# Patient Record
Sex: FEMALE | Race: WHITE | NOT HISPANIC OR LATINO | URBAN - METROPOLITAN AREA
[De-identification: names, ages, dates, MRNs, and addresses within clinical notes are randomized per-mention and may not be internally consistent; named-entity substitution may affect disease eponyms.]

---

## 2017-01-23 PROBLEM — Z85.820 PERSONAL HISTORY OF MALIGNANT MELANOMA OF SKIN: Status: ACTIVE | Noted: 2017-01-23

## 2017-10-30 ENCOUNTER — APPOINTMENT (RX ONLY)
Dept: URBAN - METROPOLITAN AREA CLINIC 20 | Facility: CLINIC | Age: 67
Setting detail: DERMATOLOGY
End: 2017-10-30

## 2017-10-30 DIAGNOSIS — Z85.820 PERSONAL HISTORY OF MALIGNANT MELANOMA OF SKIN: ICD-10-CM

## 2017-10-30 DIAGNOSIS — D22 MELANOCYTIC NEVI: ICD-10-CM

## 2017-10-30 DIAGNOSIS — L81.4 OTHER MELANIN HYPERPIGMENTATION: ICD-10-CM

## 2017-10-30 DIAGNOSIS — L82.1 OTHER SEBORRHEIC KERATOSIS: ICD-10-CM

## 2017-10-30 DIAGNOSIS — D18.0 HEMANGIOMA: ICD-10-CM

## 2017-10-30 DIAGNOSIS — L57.8 OTHER SKIN CHANGES DUE TO CHRONIC EXPOSURE TO NONIONIZING RADIATION: ICD-10-CM

## 2017-10-30 PROBLEM — D18.01 HEMANGIOMA OF SKIN AND SUBCUTANEOUS TISSUE: Status: ACTIVE | Noted: 2017-10-30

## 2017-10-30 PROBLEM — D22.5 MELANOCYTIC NEVI OF TRUNK: Status: ACTIVE | Noted: 2017-10-30

## 2017-10-30 PROCEDURE — 99214 OFFICE O/P EST MOD 30 MIN: CPT

## 2017-10-30 PROCEDURE — ? COUNSELING

## 2017-10-30 ASSESSMENT — LOCATION DETAILED DESCRIPTION DERM
LOCATION DETAILED: LEFT DORSAL FOREARM
LOCATION DETAILED: RIGHT ANTERIOR THIGH
LOCATION DETAILED: RIGHT PROXIMAL DORSAL FOREARM
LOCATION DETAILED: LEFT ANTERIOR THIGH
LOCATION DETAILED: LEFT MID BACK
LOCATION DETAILED: LEFT UPPER BACK
LOCATION DETAILED: LEFT DORSAL HAND
LOCATION DETAILED: LEFT CHEEK
LOCATION DETAILED: RIGHT UPPER BACK
LOCATION DETAILED: RIGHT DORSAL FOREARM
LOCATION DETAILED: RIGHT CHEEK
LOCATION DETAILED: RIGHT DORSAL HAND

## 2017-10-30 ASSESSMENT — LOCATION SIMPLE DESCRIPTION DERM
LOCATION SIMPLE: LEFT UPPER EXTREMITY
LOCATION SIMPLE: LEFT CHEEK
LOCATION SIMPLE: BACK
LOCATION SIMPLE: RIGHT UPPER EXTREMITY
LOCATION SIMPLE: LEFT LOWER EXTREMITY
LOCATION SIMPLE: LEFT HAND
LOCATION SIMPLE: RIGHT FOREARM
LOCATION SIMPLE: RIGHT LOWER EXTREMITY
LOCATION SIMPLE: RIGHT CHEEK
LOCATION SIMPLE: RIGHT HAND

## 2017-10-30 ASSESSMENT — LOCATION ZONE DERM
LOCATION ZONE: ARM
LOCATION ZONE: FACE
LOCATION ZONE: TRUNK
LOCATION ZONE: LEG
LOCATION ZONE: HAND

## 2018-03-23 ENCOUNTER — HOSPITAL ENCOUNTER (OUTPATIENT)
Dept: HOSPITAL 8 - CFH | Age: 68
Discharge: HOME | End: 2018-03-23
Attending: NURSE PRACTITIONER
Payer: MEDICARE

## 2018-03-23 DIAGNOSIS — M85.88: ICD-10-CM

## 2018-03-23 DIAGNOSIS — N95.8: ICD-10-CM

## 2018-03-23 DIAGNOSIS — Z13.820: ICD-10-CM

## 2018-03-23 DIAGNOSIS — Z12.31: Primary | ICD-10-CM

## 2018-03-23 PROCEDURE — 77080 DXA BONE DENSITY AXIAL: CPT

## 2018-03-23 PROCEDURE — 77063 BREAST TOMOSYNTHESIS BI: CPT

## 2018-05-04 ENCOUNTER — APPOINTMENT (RX ONLY)
Dept: URBAN - METROPOLITAN AREA CLINIC 20 | Facility: CLINIC | Age: 68
Setting detail: DERMATOLOGY
End: 2018-05-04

## 2018-05-04 DIAGNOSIS — L81.4 OTHER MELANIN HYPERPIGMENTATION: ICD-10-CM

## 2018-05-04 DIAGNOSIS — L57.8 OTHER SKIN CHANGES DUE TO CHRONIC EXPOSURE TO NONIONIZING RADIATION: ICD-10-CM

## 2018-05-04 DIAGNOSIS — D22 MELANOCYTIC NEVI: ICD-10-CM

## 2018-05-04 DIAGNOSIS — L82.1 OTHER SEBORRHEIC KERATOSIS: ICD-10-CM

## 2018-05-04 DIAGNOSIS — D18.0 HEMANGIOMA: ICD-10-CM

## 2018-05-04 DIAGNOSIS — Z85.820 PERSONAL HISTORY OF MALIGNANT MELANOMA OF SKIN: ICD-10-CM

## 2018-05-04 PROBLEM — D22.5 MELANOCYTIC NEVI OF TRUNK: Status: ACTIVE | Noted: 2018-05-04

## 2018-05-04 PROBLEM — D18.01 HEMANGIOMA OF SKIN AND SUBCUTANEOUS TISSUE: Status: ACTIVE | Noted: 2018-05-04

## 2018-05-04 PROCEDURE — 99214 OFFICE O/P EST MOD 30 MIN: CPT

## 2018-05-04 PROCEDURE — ? COUNSELING

## 2018-05-04 ASSESSMENT — LOCATION SIMPLE DESCRIPTION DERM
LOCATION SIMPLE: LEFT LOWER EXTREMITY
LOCATION SIMPLE: RIGHT UPPER EXTREMITY
LOCATION SIMPLE: RIGHT FOREARM
LOCATION SIMPLE: LEFT CHEEK
LOCATION SIMPLE: RIGHT CHEEK
LOCATION SIMPLE: BACK
LOCATION SIMPLE: RIGHT LOWER EXTREMITY
LOCATION SIMPLE: RIGHT HAND
LOCATION SIMPLE: LEFT UPPER EXTREMITY
LOCATION SIMPLE: LEFT HAND

## 2018-05-04 ASSESSMENT — LOCATION DETAILED DESCRIPTION DERM
LOCATION DETAILED: LEFT UPPER BACK
LOCATION DETAILED: LEFT ANTERIOR THIGH
LOCATION DETAILED: RIGHT UPPER BACK
LOCATION DETAILED: RIGHT DORSAL FOREARM
LOCATION DETAILED: LEFT CHEEK
LOCATION DETAILED: RIGHT DORSAL HAND
LOCATION DETAILED: LEFT DORSAL HAND
LOCATION DETAILED: LEFT MID BACK
LOCATION DETAILED: LEFT DORSAL FOREARM
LOCATION DETAILED: RIGHT ANTERIOR THIGH
LOCATION DETAILED: RIGHT PROXIMAL DORSAL FOREARM
LOCATION DETAILED: RIGHT CHEEK

## 2018-05-04 ASSESSMENT — LOCATION ZONE DERM
LOCATION ZONE: HAND
LOCATION ZONE: LEG
LOCATION ZONE: ARM
LOCATION ZONE: TRUNK
LOCATION ZONE: FACE

## 2018-11-02 ENCOUNTER — APPOINTMENT (RX ONLY)
Dept: URBAN - METROPOLITAN AREA CLINIC 20 | Facility: CLINIC | Age: 68
Setting detail: DERMATOLOGY
End: 2018-11-02

## 2018-11-02 DIAGNOSIS — D18.0 HEMANGIOMA: ICD-10-CM

## 2018-11-02 DIAGNOSIS — L82.1 OTHER SEBORRHEIC KERATOSIS: ICD-10-CM

## 2018-11-02 DIAGNOSIS — L57.8 OTHER SKIN CHANGES DUE TO CHRONIC EXPOSURE TO NONIONIZING RADIATION: ICD-10-CM

## 2018-11-02 DIAGNOSIS — Z85.820 PERSONAL HISTORY OF MALIGNANT MELANOMA OF SKIN: ICD-10-CM

## 2018-11-02 DIAGNOSIS — L81.4 OTHER MELANIN HYPERPIGMENTATION: ICD-10-CM

## 2018-11-02 DIAGNOSIS — L57.0 ACTINIC KERATOSIS: ICD-10-CM

## 2018-11-02 DIAGNOSIS — D22 MELANOCYTIC NEVI: ICD-10-CM

## 2018-11-02 PROBLEM — D18.01 HEMANGIOMA OF SKIN AND SUBCUTANEOUS TISSUE: Status: ACTIVE | Noted: 2018-11-02

## 2018-11-02 PROBLEM — D48.5 NEOPLASM OF UNCERTAIN BEHAVIOR OF SKIN: Status: ACTIVE | Noted: 2018-11-02

## 2018-11-02 PROBLEM — D22.5 MELANOCYTIC NEVI OF TRUNK: Status: ACTIVE | Noted: 2018-11-02

## 2018-11-02 PROCEDURE — 17000 DESTRUCT PREMALG LESION: CPT

## 2018-11-02 PROCEDURE — 11101: CPT

## 2018-11-02 PROCEDURE — 17003 DESTRUCT PREMALG LES 2-14: CPT

## 2018-11-02 PROCEDURE — ? BIOPSY BY SHAVE METHOD

## 2018-11-02 PROCEDURE — ? LIQUID NITROGEN

## 2018-11-02 PROCEDURE — 99214 OFFICE O/P EST MOD 30 MIN: CPT | Mod: 25

## 2018-11-02 PROCEDURE — ? COUNSELING

## 2018-11-02 PROCEDURE — 11100: CPT | Mod: 59

## 2018-11-02 ASSESSMENT — LOCATION SIMPLE DESCRIPTION DERM
LOCATION SIMPLE: RIGHT LOWER EXTREMITY
LOCATION SIMPLE: RIGHT UPPER EXTREMITY
LOCATION SIMPLE: LEFT HAND
LOCATION SIMPLE: RIGHT CHEEK
LOCATION SIMPLE: LEFT CHEEK
LOCATION SIMPLE: RIGHT HAND
LOCATION SIMPLE: LEFT LOWER EXTREMITY
LOCATION SIMPLE: BACK
LOCATION SIMPLE: LEFT UPPER BACK
LOCATION SIMPLE: RIGHT FOREARM
LOCATION SIMPLE: CHEST
LOCATION SIMPLE: LEFT UPPER EXTREMITY

## 2018-11-02 ASSESSMENT — LOCATION DETAILED DESCRIPTION DERM
LOCATION DETAILED: LEFT UPPER BACK
LOCATION DETAILED: RIGHT VENTRAL DISTAL FOREARM
LOCATION DETAILED: RIGHT DORSAL HAND
LOCATION DETAILED: UPPER STERNUM
LOCATION DETAILED: LEFT CHEEK
LOCATION DETAILED: LEFT MID BACK
LOCATION DETAILED: MIDDLE STERNUM
LOCATION DETAILED: RIGHT UPPER BACK
LOCATION DETAILED: LEFT ANTERIOR THIGH
LOCATION DETAILED: RIGHT ANTERIOR THIGH
LOCATION DETAILED: RIGHT DORSAL FOREARM
LOCATION DETAILED: LEFT DORSAL FOREARM
LOCATION DETAILED: RIGHT CHEEK
LOCATION DETAILED: RIGHT PROXIMAL DORSAL FOREARM
LOCATION DETAILED: LEFT MEDIAL UPPER BACK
LOCATION DETAILED: LEFT DORSAL HAND

## 2018-11-02 ASSESSMENT — LOCATION ZONE DERM
LOCATION ZONE: HAND
LOCATION ZONE: TRUNK
LOCATION ZONE: ARM
LOCATION ZONE: LEG
LOCATION ZONE: FACE

## 2018-11-02 NOTE — PROCEDURE: BIOPSY BY SHAVE METHOD
Billing Type: Third-Party Bill
Was A Bandage Applied: Yes
Biopsy Method: Personna blade
Electrodesiccation Text: The wound bed was treated with electrodesiccation after the biopsy was performed.
Cryotherapy Text: The wound bed was treated with cryotherapy after the biopsy was performed.
Hemostasis: Drysol and Electrocautery
Size Of Lesion In Cm: 0.7
Biopsy Type: H and E
Anesthesia Type: 1% lidocaine with 1:100,000 epinephrine and a 1:6 solution of 8.4% sodium bicarbonate
Silver Nitrate Text: The wound bed was treated with silver nitrate after the biopsy was performed.
Post-Care Instructions: I reviewed with the patient in detail post-care instructions. Patient is to keep the biopsy site dry overnight, and then apply bacitracin twice daily until healed. Patient may apply hydrogen peroxide soaks to remove any crusting.
Additional Anesthesia Volume In Cc (Will Not Render If 0): 0
Curettage Text: The wound bed was treated with curettage after the biopsy was performed.
Type Of Destruction Used: Curettage
Anesthesia Volume In Cc: 0.2
Lab Facility: 
Notification Instructions: Patient will be notified of biopsy results. However, patient instructed to call the office if not contacted within 2 weeks.
Consent: Written consent was obtained and risks were reviewed including but not limited to scarring, infection, bleeding, scabbing, incomplete removal, nerve damage and allergy to anesthesia.
X Size Of Lesion In Cm: 0.9
Bill [id number] For Specimen Handling/Conveyance To Laboratory?: no
Electrodesiccation And Curettage Text: The wound bed was treated with electrodesiccation and curettage after the biopsy was performed.
Wound Care: Aquaphor
Detail Level: Detailed
Depth Of Biopsy: dermis
Dressing: Band-Aid
Lab: [id number]
X Size Of Lesion In Cm: 0.5

## 2018-11-02 NOTE — HPI: MELANOMA F/U (HISTORY OF MALIGNANT MELANOMA)
What Is The Reason For Today's Visit?: History of Melanoma
Breslow Depth?: 0.2mm
Year Excised?: 11/2016

## 2019-01-21 ENCOUNTER — APPOINTMENT (RX ONLY)
Dept: URBAN - METROPOLITAN AREA CLINIC 20 | Facility: CLINIC | Age: 69
Setting detail: DERMATOLOGY
End: 2019-01-21

## 2019-01-21 DIAGNOSIS — L57.8 OTHER SKIN CHANGES DUE TO CHRONIC EXPOSURE TO NONIONIZING RADIATION: ICD-10-CM

## 2019-01-21 DIAGNOSIS — L57.0 ACTINIC KERATOSIS: ICD-10-CM | Status: INADEQUATELY CONTROLLED

## 2019-01-21 PROCEDURE — ? PRESCRIPTION

## 2019-01-21 PROCEDURE — ? DIAGNOSIS COMMENT

## 2019-01-21 PROCEDURE — 99214 OFFICE O/P EST MOD 30 MIN: CPT | Mod: 25

## 2019-01-21 PROCEDURE — ? COUNSELING

## 2019-01-21 PROCEDURE — 17000 DESTRUCT PREMALG LESION: CPT

## 2019-01-21 PROCEDURE — ? LIQUID NITROGEN

## 2019-01-21 PROCEDURE — ? ADDITIONAL NOTES

## 2019-01-21 RX ORDER — IMIQUIMOD 50 MG/G
CREAM TOPICAL
Qty: 1 | Refills: 1 | Status: ERX | COMMUNITY
Start: 2019-01-21

## 2019-01-21 RX ADMIN — IMIQUIMOD: 50 CREAM TOPICAL at 00:00

## 2019-01-21 ASSESSMENT — LOCATION DETAILED DESCRIPTION DERM
LOCATION DETAILED: RIGHT VENTRAL DISTAL FOREARM
LOCATION DETAILED: LEFT MEDIAL SUPERIOR CHEST
LOCATION DETAILED: UPPER STERNUM

## 2019-01-21 ASSESSMENT — LOCATION ZONE DERM
LOCATION ZONE: TRUNK
LOCATION ZONE: ARM

## 2019-01-21 ASSESSMENT — LOCATION SIMPLE DESCRIPTION DERM
LOCATION SIMPLE: RIGHT FOREARM
LOCATION SIMPLE: CHEST

## 2019-01-21 NOTE — PROCEDURE: ADDITIONAL NOTES
Additional Notes: Treat chest with imiquimod \\n1/21/19 Start\\nApply twice a day for 2 weeks\\n2/4/19 Stop\\nPatient to RTC in 1 months following treatment.
Detail Level: Simple

## 2019-03-05 ENCOUNTER — APPOINTMENT (RX ONLY)
Dept: URBAN - METROPOLITAN AREA CLINIC 20 | Facility: CLINIC | Age: 69
Setting detail: DERMATOLOGY
End: 2019-03-05

## 2019-03-05 DIAGNOSIS — D22 MELANOCYTIC NEVI: ICD-10-CM

## 2019-03-05 DIAGNOSIS — D18.0 HEMANGIOMA: ICD-10-CM

## 2019-03-05 DIAGNOSIS — L82.1 OTHER SEBORRHEIC KERATOSIS: ICD-10-CM

## 2019-03-05 DIAGNOSIS — Z85.820 PERSONAL HISTORY OF MALIGNANT MELANOMA OF SKIN: ICD-10-CM

## 2019-03-05 DIAGNOSIS — L57.8 OTHER SKIN CHANGES DUE TO CHRONIC EXPOSURE TO NONIONIZING RADIATION: ICD-10-CM

## 2019-03-05 DIAGNOSIS — L81.4 OTHER MELANIN HYPERPIGMENTATION: ICD-10-CM

## 2019-03-05 DIAGNOSIS — Z87.2 PERSONAL HISTORY OF DISEASES OF THE SKIN AND SUBCUTANEOUS TISSUE: ICD-10-CM

## 2019-03-05 PROBLEM — D22.5 MELANOCYTIC NEVI OF TRUNK: Status: ACTIVE | Noted: 2019-03-05

## 2019-03-05 PROBLEM — D18.01 HEMANGIOMA OF SKIN AND SUBCUTANEOUS TISSUE: Status: ACTIVE | Noted: 2019-03-05

## 2019-03-05 PROCEDURE — 99214 OFFICE O/P EST MOD 30 MIN: CPT

## 2019-03-05 PROCEDURE — ? COUNSELING

## 2019-03-05 PROCEDURE — ? ADDITIONAL NOTES

## 2019-03-05 PROCEDURE — ? DIAGNOSIS COMMENT

## 2019-03-05 ASSESSMENT — LOCATION DETAILED DESCRIPTION DERM
LOCATION DETAILED: LEFT DORSAL FOREARM
LOCATION DETAILED: RIGHT CHEEK
LOCATION DETAILED: LEFT MID BACK
LOCATION DETAILED: RIGHT PROXIMAL DORSAL FOREARM
LOCATION DETAILED: LEFT UPPER BACK
LOCATION DETAILED: LEFT MEDIAL SUPERIOR CHEST
LOCATION DETAILED: RIGHT DORSAL HAND
LOCATION DETAILED: LEFT DORSAL HAND
LOCATION DETAILED: RIGHT ANTERIOR THIGH
LOCATION DETAILED: LEFT ANTERIOR THIGH
LOCATION DETAILED: RIGHT DORSAL FOREARM
LOCATION DETAILED: RIGHT UPPER BACK
LOCATION DETAILED: LEFT CHEEK

## 2019-03-05 ASSESSMENT — LOCATION SIMPLE DESCRIPTION DERM
LOCATION SIMPLE: LEFT LOWER EXTREMITY
LOCATION SIMPLE: RIGHT CHEEK
LOCATION SIMPLE: BACK
LOCATION SIMPLE: LEFT UPPER EXTREMITY
LOCATION SIMPLE: LEFT HAND
LOCATION SIMPLE: LEFT CHEEK
LOCATION SIMPLE: RIGHT UPPER EXTREMITY
LOCATION SIMPLE: RIGHT LOWER EXTREMITY
LOCATION SIMPLE: RIGHT HAND
LOCATION SIMPLE: CHEST
LOCATION SIMPLE: RIGHT FOREARM

## 2019-03-05 ASSESSMENT — LOCATION ZONE DERM
LOCATION ZONE: FACE
LOCATION ZONE: LEG
LOCATION ZONE: HAND
LOCATION ZONE: TRUNK
LOCATION ZONE: ARM

## 2019-03-05 NOTE — HPI: MELANOMA F/U (HISTORY OF MALIGNANT MELANOMA)
What Is The Reason For Today's Visit?: Follow Up Melanoma
Breslow Depth?: 0.2
Year Excised?: 11/2016

## 2019-05-11 ENCOUNTER — OFFICE VISIT (OUTPATIENT)
Dept: URGENT CARE | Facility: CLINIC | Age: 69
End: 2019-05-11
Payer: MEDICARE

## 2019-05-11 VITALS
TEMPERATURE: 97.6 F | RESPIRATION RATE: 16 BRPM | OXYGEN SATURATION: 98 % | HEART RATE: 78 BPM | SYSTOLIC BLOOD PRESSURE: 102 MMHG | WEIGHT: 111 LBS | DIASTOLIC BLOOD PRESSURE: 78 MMHG

## 2019-05-11 DIAGNOSIS — R21 RASH AND NONSPECIFIC SKIN ERUPTION: ICD-10-CM

## 2019-05-11 DIAGNOSIS — L30.9 ACUTE DERMATITIS: Primary | ICD-10-CM

## 2019-05-11 PROCEDURE — 99203 OFFICE O/P NEW LOW 30 MIN: CPT | Performed by: PHYSICIAN ASSISTANT

## 2019-05-11 RX ORDER — PREDNISONE 20 MG/1
20 TABLET ORAL DAILY
Qty: 5 TAB | Refills: 0 | Status: SHIPPED | OUTPATIENT
Start: 2019-05-11 | End: 2019-05-16

## 2019-05-11 ASSESSMENT — ENCOUNTER SYMPTOMS: FEVER: 0

## 2019-05-11 NOTE — PROGRESS NOTES
Subjective:      Adeline Patel is a 69 y.o. female who presents with Lip Swelling (lip swelling , dryness, clear discharge on and off since october )    PMH:  has no past medical history on file.  MEDS:   Current Outpatient Prescriptions:   •  mupirocin (BACTROBAN) 2 % Ointment, Apply to rash twice daily., Disp: 1 Tube, Rfl: 0  •  predniSONE (DELTASONE) 20 MG Tab, Take 1 Tab by mouth every day for 5 days., Disp: 5 Tab, Rfl: 0  ALLERGIES: No Known Allergies  SURGHX: History reviewed. No pertinent surgical history.  SOCHX:  reports that she has never smoked. She has never used smokeless tobacco.  FH: Reviewed with patient, not pertinent to this visit.           Patient presents with:  Lip Swelling: lip swelling , dryness, clear discharge on and off since october (7 months). Pt has been treated for HSV1, with little relief.  Pt denies change in make up, soaps, medications.  Pt has been keeping a food diary as her PCP thinks it may be an allergy.  Pt has not noticed a pattern. Pt has appointment with dermatologist in 5 days but states she is very frustrated and would like a sooner evaluation.           Rash   This is a new problem. The current episode started more than 1 month ago. The problem has been waxing and waning since onset. The affected locations include the lips. The rash is characterized by burning, draining, pain, redness, swelling and peeling. It is unknown if there was an exposure to a precipitant. Pertinent negatives include no fever. Past treatments include moisturizer and cold compress (singulair, zyrtec). The treatment provided no relief.       Review of Systems   Constitutional: Negative for fever.   Skin: Positive for rash.   All other systems reviewed and are negative.         Objective:     /78 (BP Location: Left arm, Patient Position: Sitting, BP Cuff Size: Adult)   Pulse 78   Temp 36.4 °C (97.6 °F) (Temporal)   Resp 16   Wt 50.3 kg (111 lb)   SpO2 98%      Physical Exam      Constitutional: She is oriented to person, place, and time. She appears well-developed and well-nourished.   HENT:   Head: Normocephalic and atraumatic.   Right Ear: Tympanic membrane normal.   Left Ear: Tympanic membrane normal.   Nose: Nose normal.   Mouth/Throat: Uvula is midline and oropharynx is clear and moist.       Eyes: Pupils are equal, round, and reactive to light. Conjunctivae and EOM are normal.   Neck: Normal range of motion. Neck supple.   Cardiovascular: Normal rate, regular rhythm and normal heart sounds.    Pulmonary/Chest: Effort normal and breath sounds normal.   Abdominal: Soft.   Musculoskeletal: Normal range of motion.   Neurological: She is alert and oriented to person, place, and time. Gait normal.   Skin: Skin is warm and dry. Capillary refill takes less than 2 seconds.   Psychiatric: She has a normal mood and affect.   Nursing note and vitals reviewed.         Assessment/Plan:     1. Acute dermatitis  mupirocin (BACTROBAN) 2 % Ointment    predniSONE (DELTASONE) 20 MG Tab   2. Rash and nonspecific skin eruption       Apply ointment to rash twice daily as instructed.     Continue other medications as prescribed by PCP.     Keep dermatology appointment in 5 days.     PT should follow up with PCP in 1-2 days for re-evaluation if symptoms have not improved.  Discussed red flags and reasons to return to UC or ED.  Pt and/or family verbalized understanding of diagnosis and follow up instructions and was offered informational handout on diagnosis.  PT discharged.

## 2019-05-15 ENCOUNTER — APPOINTMENT (RX ONLY)
Dept: URBAN - METROPOLITAN AREA CLINIC 20 | Facility: CLINIC | Age: 69
Setting detail: DERMATOLOGY
End: 2019-05-15

## 2019-05-15 DIAGNOSIS — R21 RASH AND OTHER NONSPECIFIC SKIN ERUPTION: ICD-10-CM

## 2019-05-15 PROCEDURE — 99213 OFFICE O/P EST LOW 20 MIN: CPT

## 2019-05-15 PROCEDURE — ? PRESCRIPTION

## 2019-05-15 PROCEDURE — ? COUNSELING

## 2019-05-15 PROCEDURE — ? ADDITIONAL NOTES

## 2019-05-15 RX ORDER — EMOLLIENT COMBINATION NO. 25
CREAM (GRAM) TOPICAL
Qty: 1 | Refills: 3 | Status: ERX | COMMUNITY
Start: 2019-05-15

## 2019-05-15 RX ADMIN — Medication: at 00:00

## 2019-05-15 ASSESSMENT — LOCATION DETAILED DESCRIPTION DERM
LOCATION DETAILED: RIGHT UPPER CUTANEOUS LIP
LOCATION DETAILED: LEFT UPPER CUTANEOUS LIP
LOCATION DETAILED: RIGHT LOWER CUTANEOUS LIP
LOCATION DETAILED: LEFT LOWER CUTANEOUS LIP

## 2019-05-15 ASSESSMENT — LOCATION SIMPLE DESCRIPTION DERM
LOCATION SIMPLE: LEFT LIP
LOCATION SIMPLE: RIGHT LIP

## 2019-05-15 ASSESSMENT — LOCATION ZONE DERM: LOCATION ZONE: LIP

## 2019-05-15 NOTE — PROCEDURE: ADDITIONAL NOTES
Additional Notes: Plan;\\n-stop spicy or salty foods \\n-no lipsticks\\n-stop tacrolemus\\n-start elidel\\n-patch testing with Dr. Loco\\n-Vaseline to lips throughout the day.\\n-stop aquafor due to lanolin \\n-complete prednisone taper.\\n\\nRTC in 6 weeks s/p patch testing with our clinic.\\n\\nRecords requested from Dr. Vazquez patch test.
Detail Level: Simple

## 2019-06-26 ENCOUNTER — APPOINTMENT (RX ONLY)
Dept: URBAN - METROPOLITAN AREA CLINIC 20 | Facility: CLINIC | Age: 69
Setting detail: DERMATOLOGY
End: 2019-06-26

## 2019-06-26 DIAGNOSIS — R21 RASH AND OTHER NONSPECIFIC SKIN ERUPTION: ICD-10-CM | Status: STABLE

## 2019-06-26 PROCEDURE — ? COUNSELING

## 2019-06-26 PROCEDURE — 99213 OFFICE O/P EST LOW 20 MIN: CPT

## 2019-06-26 PROCEDURE — ? PRESCRIPTION

## 2019-06-26 PROCEDURE — ? ADDITIONAL NOTES

## 2019-06-26 RX ORDER — EMOLLIENT COMBINATION NO.104
CREAM (GRAM) TOPICAL
Qty: 1 | Refills: 0 | Status: ERX | COMMUNITY
Start: 2019-06-26

## 2019-06-26 RX ADMIN — Medication: at 00:00

## 2019-06-26 ASSESSMENT — LOCATION ZONE DERM: LOCATION ZONE: LIP

## 2019-06-26 ASSESSMENT — LOCATION DETAILED DESCRIPTION DERM
LOCATION DETAILED: LEFT LOWER CUTANEOUS LIP
LOCATION DETAILED: RIGHT UPPER CUTANEOUS LIP
LOCATION DETAILED: LEFT UPPER CUTANEOUS LIP
LOCATION DETAILED: RIGHT LOWER CUTANEOUS LIP

## 2019-06-26 ASSESSMENT — LOCATION SIMPLE DESCRIPTION DERM
LOCATION SIMPLE: RIGHT LIP
LOCATION SIMPLE: LEFT LIP

## 2019-06-26 NOTE — PROCEDURE: ADDITIONAL NOTES
Detail Level: Simple
Additional Notes: Noticed pt licking her lips throughout the visit instructed her to stop.\\n\\nPatient to try Dr. Imani Ballesteros lip balm, \\n\\nPrescribed Dexeryl for daily maintenance.\\n\\nReviewed notes from Dr. Vazquez shows + for clobetasol. Recommended avoiding use of TAC, see if any correlation w/ skin sloughing off in oral mucosa. \\n\\nPt has patch test in Oct.

## 2019-07-01 RX ORDER — EMOLLIENT COMBINATION NO.104
CREAM (GRAM) TOPICAL
Qty: 1 | Refills: 0 | Status: ERX

## 2019-10-15 ENCOUNTER — APPOINTMENT (RX ONLY)
Dept: URBAN - METROPOLITAN AREA CLINIC 4 | Facility: CLINIC | Age: 69
Setting detail: DERMATOLOGY
End: 2019-10-15

## 2019-10-15 DIAGNOSIS — L259 CONTACT DERMATITIS AND OTHER ECZEMA, UNSPECIFIED CAUSE: ICD-10-CM

## 2019-10-15 DIAGNOSIS — L30.9 DERMATITIS, UNSPECIFIED: ICD-10-CM

## 2019-10-15 PROCEDURE — 99213 OFFICE O/P EST LOW 20 MIN: CPT | Mod: 25

## 2019-10-15 PROCEDURE — ? COUNSELING

## 2019-10-15 PROCEDURE — 95044 PATCH/APPLICATION TESTS: CPT

## 2019-10-15 PROCEDURE — ? PATCH TESTING

## 2019-10-15 ASSESSMENT — LOCATION SIMPLE DESCRIPTION DERM: LOCATION SIMPLE: UPPER BACK

## 2019-10-15 ASSESSMENT — LOCATION DETAILED DESCRIPTION DERM: LOCATION DETAILED: INFERIOR THORACIC SPINE

## 2019-10-15 ASSESSMENT — LOCATION ZONE DERM: LOCATION ZONE: TRUNK

## 2019-10-15 NOTE — HPI: TESTING (PATCH TESTING)
Has Your Rash Been Biopsied Before?: has not been biopsied previously
Have You Had Previous Patch Testing In The Past?: none
How Severe Is Your Rash At Its Worst?: mild
Additional History: Patient had patch testing done with dr valerio she had prick testing, where she showed a positive allergy to cortisone. She was given tacrolimus ointment 0.03%. She last broke out last year in June and broke out once monthly. She has discontinued lipstick since June and has not had a severe flare.
Who Is Your Referring Doctor?: Marilee Antunez

## 2019-10-15 NOTE — PROCEDURE: PATCH TESTING
Number Of Patches (Maximum Allowable Per Dos By Cms Is 90): 80
Post-Care Instructions: I reviewed with the patient in detail post-care instructions. Patient should not sweat, pick at, or get the patches wet for 48 hours.
Consent: Verbal consent obtained, risks reviewed including but not limited to rash, itching, allergic reaction, systemic rash, remote possibility of anaphylaxis to allergen.
Detail Level: Zone

## 2019-10-17 ENCOUNTER — APPOINTMENT (RX ONLY)
Dept: URBAN - METROPOLITAN AREA CLINIC 4 | Facility: CLINIC | Age: 69
Setting detail: DERMATOLOGY
End: 2019-10-17

## 2019-10-17 DIAGNOSIS — L259 CONTACT DERMATITIS AND OTHER ECZEMA, UNSPECIFIED CAUSE: ICD-10-CM

## 2019-10-17 PROBLEM — L30.9 DERMATITIS, UNSPECIFIED: Status: ACTIVE | Noted: 2019-10-17

## 2019-10-17 PROCEDURE — 99213 OFFICE O/P EST LOW 20 MIN: CPT

## 2019-10-17 PROCEDURE — ? CORE ACDS PATCH TEST READING

## 2019-10-17 NOTE — PROCEDURE: CORE ACDS PATCH TEST READING
Allergen 50 Reaction: no reaction
Name Of Allergen 63: sorbic acid
Name Of Allergen 33: bacitracin
Name Of Allergen 70: ethyl hexyl glycerol
Name Of Allergen 34: fragrance mix 2
Name Of Allergen 26: benzocaine
Name Of Allergen 2: amerchol L101
Name Of Allergen 15: carba mix
Name Of Allergen 71: triamcinalone acetonide
Name Of Allergen 64: cananga odorata
Name Of Allergen 44: oleamidopropyl dimethylamine
Detail Level: Zone
Name Of Allergen 18: quaternium 15
Number Of Patches Read: 80
Name Of Allergen 79: 2-ethylhexyl-4-methoxycinnamate
Name Of Allergen 24: thiuram mix A
Name Of Allergen 32: 2-mercaptobenzothiazole
Name Of Allergen 25: diazolidinyl urea
Name Of Allergen 59: 4-chloro-3-cresol
Name Of Allergen 80: benzyl alcohol
Name Of Allergen 28: gold sodium thiosulfate
Name Of Allergen 10: balsam of peru
Name Of Allergen 12: cobalt 2 chloride hexahydrate
Name Of Allergen 6: fragrance mix
Name Of Allergen 58: cocunut diethanolamide
Name Of Allergen 29: imidazolidinyl urea
Name Of Allergen 39: polymyxin B sulfate
Name Of Allergen 61: benzophenone chloride
Name Of Allergen 62: sodium benzoate
Show Negative Results In The Note?: Yes
Name Of Allergen 37: propylene glycol
Name Of Allergen 54: 4-chloro-3,5-xylenol
Name Of Allergen 9: methylisothiazolinone
Name Of Allergen 66: dimethylol dihydroxyethyleneurea
Name Of Allergen 19: methyldibromo glutaronitrile
Name Of Allergen 11: ethylenediamine dihydrochloride
Name Of Allergen 47: lavandula angustifolia oil
Name Of Allergen 36: lidocaine-hci
Name Of Allergen 76: disperse orange-3
Name Of Allergen 21: formaldehyde
Name Of Allergen 52: chlorhexidine diclugonate
Name Of Allergen 74: ethyl cyanoacrylate
Name Of Allergen 75: phenoxyethanol
Name Of Allergen 48: cinnamic aldehyde
Name Of Allergen 78: butylhydroxytoluene
Name Of Allergen 14: bisphenol A epoxy resin
Name Of Allergen 4: potassium dichromate
Name Of Allergen 17: methylchloroisothiazolinone/methylisothiazolinone
Name Of Allergen 73: amidoamine
Name Of Allergen 49: dl alpha tocopherol
What Reading Time Point?: 48 hour
Name Of Allergen 3: neomycin sulfate
Name Of Allergen 35: disperse blue mix 124/106
Name Of Allergen 38: iodopropyynyl betaine
Name Of Allergen 30: budesonide
Name Of Allergen 43: 2-hydroxyethyl-methacrylate
Name Of Allergen 40: cocamidopropyl betaine
Name Of Allergen 45: decyl glucoside
Name Of Allergen 67: sorbitan sesquioleate
Name Of Allergen 68: 1,3-diphenylguanidine
Name Of Allergen 22: mercapto mix A
Name Of Allergen 27: tixocortol-21-pivalate
Name Of Allergen 46: methyl methacrylate
Name Of Allergen 77: benzoic acid
Name Of Allergen 23: 2-bromo-2-nitropropane-1,3-diol
Name Of Allergen 55: 2-hydroxy-4-methoxybenzophenone
Name Of Allergen 1: nickel sulfate hexahydrate
Name Of Allergen 72: clobetasol-17-propionate
Name Of Allergen 20: petrolatum-phenylenediamine
Name Of Allergen 13: petrolatum-tert- butylphenol formaldehyde resin
Name Of Allergen 53: propolis
Name Of Allergen 60: benzalkonium chloride
Name Of Allergen 8: paraben mix B
Name Of Allergen 41: mixed dialkyl thioureas
Name Of Allergen 50: ethyl acrylate
Name Of Allergen 16: black rubber mix
Name Of Allergen 69: cetylstearyalcohol
Name Of Allergen 56: tosylamide formaldehyde resin
Name Of Allergen 51: tea tree oil, oxidized
Name Of Allergen 7: colophony
Name Of Allergen 31: hydrocortisone-17-butyrate
Name Of Allergen 5: DMDM hydantoin
Name Of Allergen 57: sesquiterpenelactone mix
Name Of Allergen 42: dimethylaminopropylamine
Name Of Allergen 65: compositae mix

## 2019-10-17 NOTE — HPI: TESTING (PATCH TESTING)
Have You Had Previous Patch Testing In The Past?: [place] [facility]
How Severe Is Your Rash At Its Worst?: mild
Additional History: Patient is here for her 48 initial read
Who Is Your Referring Doctor?: Marilee Antunez

## 2019-10-22 ENCOUNTER — APPOINTMENT (RX ONLY)
Dept: URBAN - METROPOLITAN AREA CLINIC 4 | Facility: CLINIC | Age: 69
Setting detail: DERMATOLOGY
End: 2019-10-22

## 2019-10-22 DIAGNOSIS — L259 CONTACT DERMATITIS AND OTHER ECZEMA, UNSPECIFIED CAUSE: ICD-10-CM

## 2019-10-22 PROBLEM — L30.9 DERMATITIS, UNSPECIFIED: Status: ACTIVE | Noted: 2019-10-22

## 2019-10-22 PROCEDURE — ? ADDITIONAL NOTES

## 2019-10-22 PROCEDURE — ? CORE ACDS PATCH TEST READING

## 2019-10-22 PROCEDURE — 99214 OFFICE O/P EST MOD 30 MIN: CPT

## 2019-10-22 NOTE — PROCEDURE: CORE ACDS PATCH TEST READING
Allergen 85 Reaction: no reaction
Name Of Allergen 43: 2-hydroxyethyl-methacrylate
Name Of Allergen 54: 4-chloro-3,5-xylenol
Name Of Allergen 65: compositae mix
Name Of Allergen 15: carba mix
Name Of Allergen 47: lavandula angustifolia oil
Name Of Allergen 64: cananga odorata
Name Of Allergen 2: amerchol L101
Name Of Allergen 11: ethylenediamine dihydrochloride
Name Of Allergen 33: bacitracin
Name Of Allergen 38: iodopropyynyl betaine
Name Of Allergen 76: disperse orange-3
Name Of Allergen 49: dl alpha tocopherol
Show Allergen Counseling In The Note?: Yes
Name Of Allergen 60: benzalkonium chloride
Name Of Allergen 20: petrolatum-phenylenediamine
Name Of Allergen 35: disperse blue mix 124/106
Name Of Allergen 6: fragrance mix
Name Of Allergen 72: clobetasol-17-propionate
Name Of Allergen 56: tosylamide formaldehyde resin
Name Of Allergen 36: lidocaine-hci
Name Of Allergen 67: sorbitan sesquioleate
Name Of Allergen 80: benzyl alcohol
Name Of Allergen 42: dimethylaminopropylamine
Name Of Allergen 73: amidoamine
Name Of Allergen 59: 4-chloro-3-cresol
Name Of Allergen 66: dimethylol dihydroxyethyleneurea
Name Of Allergen 52: chlorhexidine diclugonate
Name Of Allergen 70: ethyl hexyl glycerol
Name Of Allergen 40: cocamidopropyl betaine
Name Of Allergen 25: diazolidinyl urea
Number Of Patches Read: 80
Name Of Allergen 79: 2-ethylhexyl-4-methoxycinnamate
Name Of Allergen 68: 1,3-diphenylguanidine
Name Of Allergen 50: ethyl acrylate
Name Of Allergen 51: tea tree oil, oxidized
Name Of Allergen 34: fragrance mix 2
Name Of Allergen 57: sesquiterpenelactone mix
Name Of Allergen 14: bisphenol A epoxy resin
Detail Level: Zone
Name Of Allergen 17: methylchloroisothiazolinone/methylisothiazolinone
Name Of Allergen 27: tixocortol-21-pivalate
Name Of Allergen 69: cetylstearyalcohol
Name Of Allergen 8: paraben mix B
Name Of Allergen 4: potassium dichromate
Name Of Allergen 77: benzoic acid
Name Of Allergen 63: sorbic acid
Name Of Allergen 75: phenoxyethanol
Name Of Allergen 22: mercapto mix A
Name Of Allergen 78: butylhydroxytoluene
Name Of Allergen 44: oleamidopropyl dimethylamine
Name Of Allergen 26: benzocaine
Name Of Allergen 19: methyldibromo glutaronitrile
Name Of Allergen 46: methyl methacrylate
Name Of Allergen 39: polymyxin B sulfate
Name Of Allergen 55: 2-hydroxy-4-methoxybenzophenone
Name Of Allergen 62: sodium benzoate
Name Of Allergen 18: quaternium 15
Name Of Allergen 16: black rubber mix
Name Of Allergen 12: cobalt 2 chloride hexahydrate
Name Of Allergen 3: neomycin sulfate
Name Of Allergen 58: cocunut diethanolamide
Name Of Allergen 29: imidazolidinyl urea
Name Of Allergen 37: propylene glycol
Name Of Allergen 45: decyl glucoside
Name Of Allergen 61: benzophenone chloride
Name Of Allergen 30: budesonide
Name Of Allergen 1: nickel sulfate hexahydrate
Name Of Allergen 9: methylisothiazolinone
Name Of Allergen 7: colophony
Name Of Allergen 32: 2-mercaptobenzothiazole
Name Of Allergen 28: gold sodium thiosulfate
Name Of Allergen 10: balsam of peru
Name Of Allergen 71: triamcinalone acetonide
Name Of Allergen 13: petrolatum-tert- butylphenol formaldehyde resin
Name Of Allergen 53: propolis
What Reading Time Point?: 168 hour
Name Of Allergen 23: 2-bromo-2-nitropropane-1,3-diol
Name Of Allergen 41: mixed dialkyl thioureas
Name Of Allergen 24: thiuram mix A
Name Of Allergen 48: cinnamic aldehyde
Name Of Allergen 21: formaldehyde
Name Of Allergen 5: DMDM hydantoin
Name Of Allergen 74: ethyl cyanoacrylate
Name Of Allergen 31: hydrocortisone-17-butyrate

## 2019-10-22 NOTE — HPI: TESTING (PATCH TESTING READING, DISCUSSION OF RESULTS)
How Severe Is Your Rash?: mild
What Patch Testing Have You Undergone?: Core ACDS Recommended Series
What Reading Is This?: 168 hour patch test reading
Additional History: Patient was referred to us by Marilee Antunez

## 2019-10-22 NOTE — PROCEDURE: ADDITIONAL NOTES
Additional Notes: Discussed with patient regarding the relevance of the very mild positive to gold thiosulfate. Follow up with Marilee Antunez.
Detail Level: Simple

## 2019-10-28 ENCOUNTER — APPOINTMENT (RX ONLY)
Dept: URBAN - METROPOLITAN AREA CLINIC 20 | Facility: CLINIC | Age: 69
Setting detail: DERMATOLOGY
End: 2019-10-28

## 2019-10-28 DIAGNOSIS — D22 MELANOCYTIC NEVI: ICD-10-CM

## 2019-10-28 DIAGNOSIS — L82.1 OTHER SEBORRHEIC KERATOSIS: ICD-10-CM

## 2019-10-28 DIAGNOSIS — R21 RASH AND OTHER NONSPECIFIC SKIN ERUPTION: ICD-10-CM | Status: STABLE

## 2019-10-28 DIAGNOSIS — D18.0 HEMANGIOMA: ICD-10-CM

## 2019-10-28 DIAGNOSIS — L57.0 ACTINIC KERATOSIS: ICD-10-CM | Status: INADEQUATELY CONTROLLED

## 2019-10-28 DIAGNOSIS — L57.8 OTHER SKIN CHANGES DUE TO CHRONIC EXPOSURE TO NONIONIZING RADIATION: ICD-10-CM

## 2019-10-28 DIAGNOSIS — L81.4 OTHER MELANIN HYPERPIGMENTATION: ICD-10-CM

## 2019-10-28 DIAGNOSIS — Z85.820 PERSONAL HISTORY OF MALIGNANT MELANOMA OF SKIN: ICD-10-CM

## 2019-10-28 PROBLEM — D22.5 MELANOCYTIC NEVI OF TRUNK: Status: ACTIVE | Noted: 2019-10-28

## 2019-10-28 PROBLEM — D18.01 HEMANGIOMA OF SKIN AND SUBCUTANEOUS TISSUE: Status: ACTIVE | Noted: 2019-10-28

## 2019-10-28 PROCEDURE — 99214 OFFICE O/P EST MOD 30 MIN: CPT

## 2019-10-28 PROCEDURE — ? DIAGNOSIS COMMENT

## 2019-10-28 PROCEDURE — ? COUNSELING

## 2019-10-28 PROCEDURE — ? ADDITIONAL NOTES

## 2019-10-28 ASSESSMENT — LOCATION SIMPLE DESCRIPTION DERM
LOCATION SIMPLE: LEFT UPPER EXTREMITY
LOCATION SIMPLE: LEFT LIP
LOCATION SIMPLE: RIGHT HAND
LOCATION SIMPLE: RIGHT LIP
LOCATION SIMPLE: BACK
LOCATION SIMPLE: RIGHT FOREARM
LOCATION SIMPLE: RIGHT LOWER EXTREMITY
LOCATION SIMPLE: LEFT HAND
LOCATION SIMPLE: LEFT LOWER EXTREMITY
LOCATION SIMPLE: RIGHT UPPER EXTREMITY
LOCATION SIMPLE: RIGHT CHEEK
LOCATION SIMPLE: LEFT CHEEK

## 2019-10-28 ASSESSMENT — LOCATION DETAILED DESCRIPTION DERM
LOCATION DETAILED: LEFT UPPER BACK
LOCATION DETAILED: LEFT DORSAL HAND
LOCATION DETAILED: RIGHT CHEEK
LOCATION DETAILED: LEFT CHEEK
LOCATION DETAILED: LEFT DORSAL FOREARM
LOCATION DETAILED: LEFT LOWER CUTANEOUS LIP
LOCATION DETAILED: RIGHT PROXIMAL DORSAL FOREARM
LOCATION DETAILED: LEFT UPPER CUTANEOUS LIP
LOCATION DETAILED: LEFT MID BACK
LOCATION DETAILED: LEFT ANTERIOR THIGH
LOCATION DETAILED: RIGHT UPPER CUTANEOUS LIP
LOCATION DETAILED: RIGHT DORSAL FOREARM
LOCATION DETAILED: LEFT INFERIOR MEDIAL MALAR CHEEK
LOCATION DETAILED: RIGHT DORSAL HAND
LOCATION DETAILED: RIGHT ANTERIOR THIGH
LOCATION DETAILED: RIGHT UPPER BACK
LOCATION DETAILED: RIGHT LOWER CUTANEOUS LIP

## 2019-10-28 ASSESSMENT — LOCATION ZONE DERM
LOCATION ZONE: FACE
LOCATION ZONE: HAND
LOCATION ZONE: LIP
LOCATION ZONE: LEG
LOCATION ZONE: ARM
LOCATION ZONE: TRUNK

## 2019-10-28 NOTE — PROCEDURE: DIAGNOSIS COMMENT
Comment: 10/22/19; 80 extended patch test completed; mild positive to gold thiosulfate
Detail Level: Simple

## 2019-11-19 ENCOUNTER — APPOINTMENT (RX ONLY)
Dept: URBAN - METROPOLITAN AREA CLINIC 22 | Facility: CLINIC | Age: 69
Setting detail: DERMATOLOGY
End: 2019-11-19

## 2019-11-19 DIAGNOSIS — L57.8 OTHER SKIN CHANGES DUE TO CHRONIC EXPOSURE TO NONIONIZING RADIATION: ICD-10-CM

## 2019-11-19 DIAGNOSIS — L57.0 ACTINIC KERATOSIS: ICD-10-CM

## 2019-11-19 PROCEDURE — 17000 DESTRUCT PREMALG LESION: CPT

## 2019-11-19 PROCEDURE — ? COUNSELING

## 2019-11-19 PROCEDURE — ? LIQUID NITROGEN

## 2019-11-19 PROCEDURE — ? ADDITIONAL NOTES

## 2019-11-19 PROCEDURE — 17003 DESTRUCT PREMALG LES 2-14: CPT

## 2019-11-19 PROCEDURE — 99213 OFFICE O/P EST LOW 20 MIN: CPT | Mod: 25

## 2019-11-19 ASSESSMENT — LOCATION SIMPLE DESCRIPTION DERM
LOCATION SIMPLE: LEFT FOREHEAD
LOCATION SIMPLE: LEFT CHEEK
LOCATION SIMPLE: RIGHT FOREHEAD

## 2019-11-19 ASSESSMENT — LOCATION DETAILED DESCRIPTION DERM
LOCATION DETAILED: RIGHT SUPERIOR FOREHEAD
LOCATION DETAILED: LEFT INFERIOR MEDIAL MALAR CHEEK
LOCATION DETAILED: LEFT MEDIAL FOREHEAD
LOCATION DETAILED: RIGHT FOREHEAD
LOCATION DETAILED: LEFT SUPERIOR FOREHEAD
LOCATION DETAILED: LEFT FOREHEAD

## 2019-11-19 ASSESSMENT — LOCATION ZONE DERM: LOCATION ZONE: FACE

## 2019-11-19 NOTE — PROCEDURE: ADDITIONAL NOTES
Additional Notes: Red light in 04/2020
Detail Level: Simple
Additional Notes: Reviewed at length the difference between LN2, redlight, and topical immunomodulator vs chemo cream\\nDiscussed MOA, SE and length of recovery. \\n\\nPt opted for LN2 today.  Will consider redlight in future

## 2019-11-19 NOTE — PROCEDURE: COUNSELING
PET Scan  A PET scan is a diagnostic imaging test. It is a type of nuclear medicine procedure. This means it uses a tiny amount of a radioactive substance, called a tracer, to look for problems in the body. A PET scan can be used to check organs such as your heart and brain. It can also look at body tissues like lymph nodes. Some other imaging tests show the structure of a body part. But a PET scan shows changes in how an organ or tissue works. This can help your healthcare provider diagnose problems and create a care plan for you.  Why a PET scan is done    PET scans are often done to help diagnose or manage some conditions. These include:  · Coronary artery disease  · Brain tumors  · Cancer  · Seizure disorders  · Memory problems  · Stroke  · Brain disorders, such as Alzheimer, [place], or Parkinson disease  Getting ready for a PET scan  You will be told how to get ready for your scan. It is important to follow these instructions carefully. If you don't, the test may not be accurate. You may need to take the test again.  You may be told to:  · Not eat for at least 4 to 6 hours before the scan. This includes gum and mints.  · Drink plenty of water before the scan  · Not drink any liquids that have sugar or calories. This includes soda, juice, energy drinks, sweetened bottled water, and alcohol.  · Avoid any strenuous activities or exercise for 24 hours before the test. These activities may interfere with your test results.  Tell your healthcare provider about any medicines you take. Ask if you should take them as usual before your exam. You may need to stop taking them before the test. This includes:  · All prescription medicines  · Over-the-counter medicines such as aspirin or ibuprofen  · Street drugs  · Herbs, vitamins, and other supplements  Also tell your healthcare provider if you:  · Are pregnant, or think you may be pregnant  · Are breastfeeding  · Have ever had an allergic reaction to X-ray dye  Detail Level: Zone (contrast medium)  · Have diabetes. You will be given special instructions regarding your medicines and food intake.  Follow any other instructions from your provider.  During your procedure  · Tell your healthcare provider if you are afraid of small spaces. This is called claustrophobia. You may be given medicine called a sedative to help you relax before the test.  · You will be given a tiny amount of a radioactive substance called a tracer. This will be given through an IV (intravenous) line in a vein in your arm or hand. While your body absorbs the tracer, you will rest quietly on a table or in a reclining chair for 30 to 60 minutes. Once the tracer is absorbed, the scan can be done.  · If you are having a CT scan done at the same time, you may be given X-ray dye, also called contrast medium. It will also be given through an IV.  · For the scan, you will lie on a cushioned table that will slide into the scanner. The scan itself takes 30 to 90 minutes. During that time, try to stay as still as possible. Move only when you are told.  After your procedure  · If you were given a sedative, have an adult friend or family member drive you home.  · Over the next few hours, drink plenty of clear fluids. This will help flush the tracer out of your body.  · The radioactive material in the tracer will not harm your loved ones.      Risks and possible complications   · Radiation exposure. A PET scan has a typical exposure level for medical diagnostic tests. It is felt to be safe.  · Allergic reaction to the injected tracer or X-ray dye. This is rare, but may occur.   © 2181-1242 [employer]. [address] [place] [address], [place] [address]. All rights reserved. This information is not intended as a substitute for professional medical care. Always follow your healthcare professional's instructions.

## 2020-06-03 ENCOUNTER — APPOINTMENT (RX ONLY)
Dept: URBAN - METROPOLITAN AREA CLINIC 20 | Facility: CLINIC | Age: 70
Setting detail: DERMATOLOGY
End: 2020-06-03

## 2020-06-03 DIAGNOSIS — D22 MELANOCYTIC NEVI: ICD-10-CM

## 2020-06-03 DIAGNOSIS — Z85.820 PERSONAL HISTORY OF MALIGNANT MELANOMA OF SKIN: ICD-10-CM

## 2020-06-03 DIAGNOSIS — L81.4 OTHER MELANIN HYPERPIGMENTATION: ICD-10-CM

## 2020-06-03 DIAGNOSIS — L57.8 OTHER SKIN CHANGES DUE TO CHRONIC EXPOSURE TO NONIONIZING RADIATION: ICD-10-CM

## 2020-06-03 DIAGNOSIS — D18.0 HEMANGIOMA: ICD-10-CM

## 2020-06-03 DIAGNOSIS — R21 RASH AND OTHER NONSPECIFIC SKIN ERUPTION: ICD-10-CM

## 2020-06-03 DIAGNOSIS — L82.1 OTHER SEBORRHEIC KERATOSIS: ICD-10-CM

## 2020-06-03 PROBLEM — D18.01 HEMANGIOMA OF SKIN AND SUBCUTANEOUS TISSUE: Status: ACTIVE | Noted: 2020-06-03

## 2020-06-03 PROBLEM — D22.5 MELANOCYTIC NEVI OF TRUNK: Status: ACTIVE | Noted: 2020-06-03

## 2020-06-03 PROCEDURE — ? COUNSELING

## 2020-06-03 PROCEDURE — ? DIAGNOSIS COMMENT

## 2020-06-03 PROCEDURE — 99214 OFFICE O/P EST MOD 30 MIN: CPT

## 2020-06-03 ASSESSMENT — LOCATION SIMPLE DESCRIPTION DERM
LOCATION SIMPLE: LEFT UPPER EXTREMITY
LOCATION SIMPLE: LEFT LOWER EXTREMITY
LOCATION SIMPLE: RIGHT CHEEK
LOCATION SIMPLE: RIGHT FOREARM
LOCATION SIMPLE: LEFT HAND
LOCATION SIMPLE: RIGHT LOWER EXTREMITY
LOCATION SIMPLE: RIGHT HAND
LOCATION SIMPLE: RIGHT UPPER EXTREMITY
LOCATION SIMPLE: BACK
LOCATION SIMPLE: LEFT PRETIBIAL REGION
LOCATION SIMPLE: LEFT LIP
LOCATION SIMPLE: RIGHT LIP
LOCATION SIMPLE: LEFT CHEEK

## 2020-06-03 ASSESSMENT — LOCATION DETAILED DESCRIPTION DERM
LOCATION DETAILED: RIGHT UPPER CUTANEOUS LIP
LOCATION DETAILED: LEFT DORSAL HAND
LOCATION DETAILED: LEFT LOWER CUTANEOUS LIP
LOCATION DETAILED: LEFT LATERAL PROXIMAL PRETIBIAL REGION
LOCATION DETAILED: RIGHT DORSAL FOREARM
LOCATION DETAILED: LEFT MID BACK
LOCATION DETAILED: RIGHT ANTERIOR THIGH
LOCATION DETAILED: RIGHT DORSAL HAND
LOCATION DETAILED: RIGHT PROXIMAL DORSAL FOREARM
LOCATION DETAILED: LEFT DORSAL FOREARM
LOCATION DETAILED: RIGHT CHEEK
LOCATION DETAILED: RIGHT LOWER CUTANEOUS LIP
LOCATION DETAILED: LEFT CHEEK
LOCATION DETAILED: LEFT ANTERIOR THIGH
LOCATION DETAILED: LEFT UPPER CUTANEOUS LIP
LOCATION DETAILED: LEFT UPPER BACK

## 2020-06-03 ASSESSMENT — LOCATION ZONE DERM
LOCATION ZONE: FACE
LOCATION ZONE: HAND
LOCATION ZONE: LEG
LOCATION ZONE: ARM
LOCATION ZONE: TRUNK
LOCATION ZONE: LIP

## 2020-06-05 ENCOUNTER — HOSPITAL ENCOUNTER (OUTPATIENT)
Dept: HOSPITAL 8 - CFH | Age: 70
Discharge: HOME | End: 2020-06-05
Attending: INTERNAL MEDICINE
Payer: MEDICARE

## 2020-06-05 DIAGNOSIS — Z12.31: Primary | ICD-10-CM

## 2020-06-05 DIAGNOSIS — M81.0: ICD-10-CM

## 2020-06-05 PROCEDURE — 77067 SCR MAMMO BI INCL CAD: CPT

## 2020-06-05 PROCEDURE — 77063 BREAST TOMOSYNTHESIS BI: CPT

## 2020-06-05 PROCEDURE — 77080 DXA BONE DENSITY AXIAL: CPT

## 2020-11-24 ENCOUNTER — PRE-ADMISSION TESTING (OUTPATIENT)
Dept: ADMISSIONS | Facility: MEDICAL CENTER | Age: 70
End: 2020-11-24
Attending: ORTHOPAEDIC SURGERY
Payer: MEDICARE

## 2020-11-24 DIAGNOSIS — Z01.812 PRE-OPERATIVE LABORATORY EXAMINATION: ICD-10-CM

## 2020-11-24 RX ORDER — VITAMIN B COMPLEX
1000 TABLET ORAL EVERY MORNING
COMMUNITY
End: 2023-02-22

## 2020-11-24 RX ORDER — IBUPROFEN 200 MG
400 TABLET ORAL EVERY 6 HOURS PRN
COMMUNITY
End: 2022-06-13

## 2020-11-24 RX ORDER — ACYCLOVIR 400 MG/1
400 TABLET ORAL EVERY MORNING
COMMUNITY
End: 2022-05-26 | Stop reason: SDUPTHER

## 2020-11-28 ENCOUNTER — PRE-ADMISSION TESTING (OUTPATIENT)
Dept: ADMISSIONS | Facility: MEDICAL CENTER | Age: 70
End: 2020-11-28
Attending: ORTHOPAEDIC SURGERY
Payer: MEDICARE

## 2020-11-28 DIAGNOSIS — Z01.812 PRE-OPERATIVE LABORATORY EXAMINATION: ICD-10-CM

## 2020-11-28 LAB
COVID ORDER STATUS COVID19: NORMAL
SARS-COV-2 RNA RESP QL NAA+PROBE: NOTDETECTED
SPECIMEN SOURCE: NORMAL

## 2020-11-28 PROCEDURE — C9803 HOPD COVID-19 SPEC COLLECT: HCPCS

## 2020-11-28 PROCEDURE — U0003 INFECTIOUS AGENT DETECTION BY NUCLEIC ACID (DNA OR RNA); SEVERE ACUTE RESPIRATORY SYNDROME CORONAVIRUS 2 (SARS-COV-2) (CORONAVIRUS DISEASE [COVID-19]), AMPLIFIED PROBE TECHNIQUE, MAKING USE OF HIGH THROUGHPUT TECHNOLOGIES AS DESCRIBED BY CMS-2020-01-R: HCPCS

## 2020-11-30 ENCOUNTER — ANESTHESIA EVENT (OUTPATIENT)
Dept: SURGERY | Facility: MEDICAL CENTER | Age: 70
End: 2020-11-30
Payer: MEDICARE

## 2020-11-30 ENCOUNTER — ANESTHESIA (OUTPATIENT)
Dept: SURGERY | Facility: MEDICAL CENTER | Age: 70
End: 2020-11-30
Payer: MEDICARE

## 2020-11-30 ENCOUNTER — HOSPITAL ENCOUNTER (OUTPATIENT)
Facility: MEDICAL CENTER | Age: 70
End: 2020-11-30
Attending: ORTHOPAEDIC SURGERY | Admitting: ORTHOPAEDIC SURGERY
Payer: MEDICARE

## 2020-11-30 VITALS
TEMPERATURE: 98.1 F | RESPIRATION RATE: 14 BRPM | SYSTOLIC BLOOD PRESSURE: 114 MMHG | DIASTOLIC BLOOD PRESSURE: 55 MMHG | HEART RATE: 72 BPM | BODY MASS INDEX: 17.54 KG/M2 | HEIGHT: 64 IN | OXYGEN SATURATION: 97 % | WEIGHT: 102.73 LBS

## 2020-11-30 DIAGNOSIS — M20.21 HALLUX RIGIDUS OF RIGHT FOOT: ICD-10-CM

## 2020-11-30 LAB — PATHOLOGY CONSULT NOTE: NORMAL

## 2020-11-30 PROCEDURE — 160009 HCHG ANES TIME/MIN: Performed by: ORTHOPAEDIC SURGERY

## 2020-11-30 PROCEDURE — 160025 RECOVERY II MINUTES (STATS): Performed by: ORTHOPAEDIC SURGERY

## 2020-11-30 PROCEDURE — 160035 HCHG PACU - 1ST 60 MINS PHASE I: Performed by: ORTHOPAEDIC SURGERY

## 2020-11-30 PROCEDURE — A6223 GAUZE >16<=48 NO W/SAL W/O B: HCPCS | Performed by: ORTHOPAEDIC SURGERY

## 2020-11-30 PROCEDURE — A6454 SELF-ADHER BAND W>=3" <5"/YD: HCPCS | Performed by: ORTHOPAEDIC SURGERY

## 2020-11-30 PROCEDURE — A9270 NON-COVERED ITEM OR SERVICE: HCPCS | Performed by: ORTHOPAEDIC SURGERY

## 2020-11-30 PROCEDURE — 88304 TISSUE EXAM BY PATHOLOGIST: CPT

## 2020-11-30 PROCEDURE — 501838 HCHG SUTURE GENERAL: Performed by: ORTHOPAEDIC SURGERY

## 2020-11-30 PROCEDURE — 160002 HCHG RECOVERY MINUTES (STAT): Performed by: ORTHOPAEDIC SURGERY

## 2020-11-30 PROCEDURE — 160041 HCHG SURGERY MINUTES - EA ADDL 1 MIN LEVEL 4: Performed by: ORTHOPAEDIC SURGERY

## 2020-11-30 PROCEDURE — 700105 HCHG RX REV CODE 258: Performed by: ORTHOPAEDIC SURGERY

## 2020-11-30 PROCEDURE — 700102 HCHG RX REV CODE 250 W/ 637 OVERRIDE(OP): Performed by: ANESTHESIOLOGY

## 2020-11-30 PROCEDURE — 700111 HCHG RX REV CODE 636 W/ 250 OVERRIDE (IP)

## 2020-11-30 PROCEDURE — 700111 HCHG RX REV CODE 636 W/ 250 OVERRIDE (IP): Performed by: ANESTHESIOLOGY

## 2020-11-30 PROCEDURE — 700101 HCHG RX REV CODE 250: Performed by: ORTHOPAEDIC SURGERY

## 2020-11-30 PROCEDURE — 700102 HCHG RX REV CODE 250 W/ 637 OVERRIDE(OP): Performed by: ORTHOPAEDIC SURGERY

## 2020-11-30 PROCEDURE — 700101 HCHG RX REV CODE 250: Performed by: ANESTHESIOLOGY

## 2020-11-30 PROCEDURE — 160047 HCHG PACU  - EA ADDL 30 MINS PHASE II: Performed by: ORTHOPAEDIC SURGERY

## 2020-11-30 PROCEDURE — A9270 NON-COVERED ITEM OR SERVICE: HCPCS | Performed by: ANESTHESIOLOGY

## 2020-11-30 PROCEDURE — 500112 HCHG BONE WAX: Performed by: ORTHOPAEDIC SURGERY

## 2020-11-30 PROCEDURE — 160048 HCHG OR STATISTICAL LEVEL 1-5: Performed by: ORTHOPAEDIC SURGERY

## 2020-11-30 PROCEDURE — 500881 HCHG PACK, EXTREMITY: Performed by: ORTHOPAEDIC SURGERY

## 2020-11-30 PROCEDURE — 160046 HCHG PACU - 1ST 60 MINS PHASE II: Performed by: ORTHOPAEDIC SURGERY

## 2020-11-30 PROCEDURE — 160029 HCHG SURGERY MINUTES - 1ST 30 MINS LEVEL 4: Performed by: ORTHOPAEDIC SURGERY

## 2020-11-30 RX ORDER — HYDROMORPHONE HYDROCHLORIDE 1 MG/ML
0.4 INJECTION, SOLUTION INTRAMUSCULAR; INTRAVENOUS; SUBCUTANEOUS
Status: DISCONTINUED | OUTPATIENT
Start: 2020-11-30 | End: 2020-11-30 | Stop reason: HOSPADM

## 2020-11-30 RX ORDER — OXYCODONE HCL 5 MG/5 ML
5 SOLUTION, ORAL ORAL
Status: COMPLETED | OUTPATIENT
Start: 2020-11-30 | End: 2020-11-30

## 2020-11-30 RX ORDER — HYDROMORPHONE HYDROCHLORIDE 1 MG/ML
0.1 INJECTION, SOLUTION INTRAMUSCULAR; INTRAVENOUS; SUBCUTANEOUS
Status: DISCONTINUED | OUTPATIENT
Start: 2020-11-30 | End: 2020-11-30 | Stop reason: HOSPADM

## 2020-11-30 RX ORDER — HYDROMORPHONE HYDROCHLORIDE 1 MG/ML
0.2 INJECTION, SOLUTION INTRAMUSCULAR; INTRAVENOUS; SUBCUTANEOUS
Status: DISCONTINUED | OUTPATIENT
Start: 2020-11-30 | End: 2020-11-30 | Stop reason: HOSPADM

## 2020-11-30 RX ORDER — HYDRALAZINE HYDROCHLORIDE 20 MG/ML
5 INJECTION INTRAMUSCULAR; INTRAVENOUS
Status: DISCONTINUED | OUTPATIENT
Start: 2020-11-30 | End: 2020-11-30 | Stop reason: HOSPADM

## 2020-11-30 RX ORDER — CEFAZOLIN SODIUM 1 G/3ML
INJECTION, POWDER, FOR SOLUTION INTRAMUSCULAR; INTRAVENOUS PRN
Status: DISCONTINUED | OUTPATIENT
Start: 2020-11-30 | End: 2020-11-30 | Stop reason: SURG

## 2020-11-30 RX ORDER — MAGNESIUM HYDROXIDE 1200 MG/15ML
LIQUID ORAL
Status: COMPLETED | OUTPATIENT
Start: 2020-11-30 | End: 2020-11-30

## 2020-11-30 RX ORDER — DIPHENHYDRAMINE HYDROCHLORIDE 50 MG/ML
12.5 INJECTION INTRAMUSCULAR; INTRAVENOUS
Status: DISCONTINUED | OUTPATIENT
Start: 2020-11-30 | End: 2020-11-30 | Stop reason: HOSPADM

## 2020-11-30 RX ORDER — BUPIVACAINE HYDROCHLORIDE 5 MG/ML
INJECTION, SOLUTION EPIDURAL; INTRACAUDAL
Status: DISCONTINUED | OUTPATIENT
Start: 2020-11-30 | End: 2020-11-30 | Stop reason: HOSPADM

## 2020-11-30 RX ORDER — ONDANSETRON 2 MG/ML
INJECTION INTRAMUSCULAR; INTRAVENOUS PRN
Status: DISCONTINUED | OUTPATIENT
Start: 2020-11-30 | End: 2020-11-30 | Stop reason: SURG

## 2020-11-30 RX ORDER — SODIUM CHLORIDE, SODIUM LACTATE, POTASSIUM CHLORIDE, CALCIUM CHLORIDE 600; 310; 30; 20 MG/100ML; MG/100ML; MG/100ML; MG/100ML
INJECTION, SOLUTION INTRAVENOUS CONTINUOUS
Status: DISCONTINUED | OUTPATIENT
Start: 2020-11-30 | End: 2020-11-30

## 2020-11-30 RX ORDER — LIDOCAINE HYDROCHLORIDE 10 MG/ML
INJECTION, SOLUTION EPIDURAL; INFILTRATION; INTRACAUDAL; PERINEURAL
Status: COMPLETED
Start: 2020-11-30 | End: 2020-11-30

## 2020-11-30 RX ORDER — SODIUM CHLORIDE, SODIUM LACTATE, POTASSIUM CHLORIDE, CALCIUM CHLORIDE 600; 310; 30; 20 MG/100ML; MG/100ML; MG/100ML; MG/100ML
INJECTION, SOLUTION INTRAVENOUS CONTINUOUS
Status: DISCONTINUED | OUTPATIENT
Start: 2020-11-30 | End: 2020-11-30 | Stop reason: HOSPADM

## 2020-11-30 RX ORDER — MEPERIDINE HYDROCHLORIDE 25 MG/ML
6.25 INJECTION INTRAMUSCULAR; INTRAVENOUS; SUBCUTANEOUS
Status: DISCONTINUED | OUTPATIENT
Start: 2020-11-30 | End: 2020-11-30 | Stop reason: HOSPADM

## 2020-11-30 RX ORDER — DEXAMETHASONE SODIUM PHOSPHATE 4 MG/ML
INJECTION, SOLUTION INTRA-ARTICULAR; INTRALESIONAL; INTRAMUSCULAR; INTRAVENOUS; SOFT TISSUE PRN
Status: DISCONTINUED | OUTPATIENT
Start: 2020-11-30 | End: 2020-11-30 | Stop reason: SURG

## 2020-11-30 RX ORDER — OXYCODONE HCL 5 MG/5 ML
10 SOLUTION, ORAL ORAL
Status: COMPLETED | OUTPATIENT
Start: 2020-11-30 | End: 2020-11-30

## 2020-11-30 RX ORDER — ONDANSETRON 2 MG/ML
4 INJECTION INTRAMUSCULAR; INTRAVENOUS
Status: DISCONTINUED | OUTPATIENT
Start: 2020-11-30 | End: 2020-11-30 | Stop reason: HOSPADM

## 2020-11-30 RX ORDER — LIDOCAINE HYDROCHLORIDE 20 MG/ML
INJECTION, SOLUTION EPIDURAL; INFILTRATION; INTRACAUDAL; PERINEURAL PRN
Status: DISCONTINUED | OUTPATIENT
Start: 2020-11-30 | End: 2020-11-30 | Stop reason: SURG

## 2020-11-30 RX ORDER — LABETALOL HYDROCHLORIDE 5 MG/ML
5 INJECTION, SOLUTION INTRAVENOUS
Status: DISCONTINUED | OUTPATIENT
Start: 2020-11-30 | End: 2020-11-30 | Stop reason: HOSPADM

## 2020-11-30 RX ORDER — HALOPERIDOL 5 MG/ML
1 INJECTION INTRAMUSCULAR
Status: DISCONTINUED | OUTPATIENT
Start: 2020-11-30 | End: 2020-11-30 | Stop reason: HOSPADM

## 2020-11-30 RX ADMIN — FENTANYL CITRATE 25 MCG: 50 INJECTION, SOLUTION INTRAMUSCULAR; INTRAVENOUS at 10:41

## 2020-11-30 RX ADMIN — POVIDONE IODINE 15 ML: 100 SOLUTION TOPICAL at 09:07

## 2020-11-30 RX ADMIN — PROPOFOL 100 MG: 10 INJECTION, EMULSION INTRAVENOUS at 10:16

## 2020-11-30 RX ADMIN — FENTANYL CITRATE 25 MCG: 50 INJECTION, SOLUTION INTRAMUSCULAR; INTRAVENOUS at 10:30

## 2020-11-30 RX ADMIN — FENTANYL CITRATE 25 MCG: 50 INJECTION, SOLUTION INTRAMUSCULAR; INTRAVENOUS at 10:23

## 2020-11-30 RX ADMIN — EPHEDRINE SULFATE 10 MG: 50 INJECTION INTRAMUSCULAR; INTRAVENOUS; SUBCUTANEOUS at 10:44

## 2020-11-30 RX ADMIN — ONDANSETRON 4 MG: 2 INJECTION INTRAMUSCULAR; INTRAVENOUS at 10:25

## 2020-11-30 RX ADMIN — CEFAZOLIN 2 G: 330 INJECTION, POWDER, FOR SOLUTION INTRAMUSCULAR; INTRAVENOUS at 10:11

## 2020-11-30 RX ADMIN — FENTANYL CITRATE 25 MCG: 50 INJECTION, SOLUTION INTRAMUSCULAR; INTRAVENOUS at 11:20

## 2020-11-30 RX ADMIN — LIDOCAINE HYDROCHLORIDE 50 MG: 20 INJECTION, SOLUTION EPIDURAL; INFILTRATION; INTRACAUDAL at 10:16

## 2020-11-30 RX ADMIN — SODIUM CHLORIDE, POTASSIUM CHLORIDE, SODIUM LACTATE AND CALCIUM CHLORIDE: 600; 310; 30; 20 INJECTION, SOLUTION INTRAVENOUS at 09:04

## 2020-11-30 RX ADMIN — DEXAMETHASONE SODIUM PHOSPHATE 8 MG: 4 INJECTION, SOLUTION INTRA-ARTICULAR; INTRALESIONAL; INTRAMUSCULAR; INTRAVENOUS; SOFT TISSUE at 10:17

## 2020-11-30 RX ADMIN — Medication 0.5 ML: at 09:04

## 2020-11-30 RX ADMIN — FENTANYL CITRATE 25 MCG: 50 INJECTION, SOLUTION INTRAMUSCULAR; INTRAVENOUS at 11:08

## 2020-11-30 RX ADMIN — LIDOCAINE HYDROCHLORIDE 0.5 ML: 10 INJECTION, SOLUTION EPIDURAL; INFILTRATION; INTRACAUDAL; PERINEURAL at 09:04

## 2020-11-30 RX ADMIN — FENTANYL CITRATE 25 MCG: 50 INJECTION, SOLUTION INTRAMUSCULAR; INTRAVENOUS at 10:36

## 2020-11-30 RX ADMIN — OXYCODONE HYDROCHLORIDE 5 MG: 5 SOLUTION ORAL at 11:08

## 2020-11-30 SDOH — HEALTH STABILITY: MENTAL HEALTH: HOW OFTEN DO YOU HAVE 6 OR MORE DRINKS ON ONE OCCASION?: NEVER

## 2020-11-30 SDOH — HEALTH STABILITY: MENTAL HEALTH: HOW OFTEN DO YOU HAVE A DRINK CONTAINING ALCOHOL?: 4 OR MORE TIMES A WEEK

## 2020-11-30 SDOH — HEALTH STABILITY: MENTAL HEALTH: HOW MANY STANDARD DRINKS CONTAINING ALCOHOL DO YOU HAVE ON A TYPICAL DAY?: 1 OR 2

## 2020-11-30 ASSESSMENT — PAIN DESCRIPTION - PAIN TYPE
TYPE: SURGICAL PAIN

## 2020-11-30 NOTE — OP REPORT
DATE OF SERVICE:  11/30/2020    PREOPERATIVE DIAGNOSES:  1.  Right hallux rigidus.  2.  Right third webspace neuroma.    POSTOPERATIVE DIAGNOSES:  1.  Right hallux rigidus.  2.  Right third webspace neuroma.    PROCEDURES PERFORMED:  1.  Right first metatarsophalangeal joint cheilectomy.  2.  Right third webspace neuroma excision.    SURGEON:  Jaun Cook MD    FIRST ASSISTANT:  Eulalio Franco MD    SECOND ASSISTANT:  Priscila Marinelli.    ANESTHESIA:  General endotracheal with ankle block.    ESTIMATED BLOOD LOSS:  None.    COMPLICATIONS:  None.    SPECIMENS:  Right third webspace neuroma.    POSTOPERATIVE PLAN:  1.  Weightbearing as tolerated in a postop shoe.  2.  Preoperative antibiotics.  3.  Follow up in 2 weeks.    INDICATIONS:  The patient is a pleasant 70-year-old female who has had   problems with her right foot for some time.  The above diagnoses were made and   options were discussed with her including operative and nonoperative.  ___   over her great toe in preoperative and she did state that she did have some   pain with it.  Options were discussed with her including operative and   nonoperative.  She elected to undergo operative intervention.  The above   procedure was discussed and all questions were answered.  Risks of surgery   were explained, which include but not limited to wound problems, infection,   nerve injury, vascular injury, need for further surgery.  She understands and   accepts this risk for pain and need for further surgery.  She understands and   accepts these risks and agrees to proceed.  Her site was marked by myself   prior to receiving psychotropic medicines.    PROCEDURE IN DETAIL:  The patient was brought to the operating room and the   patient underwent general endotracheal anesthesia without complications.    Right lower extremity was prepped and draped in standard fashion in supine   position with all appropriate padding.  Positive site verification confirmed   her  right lower extremity as well as above procedure and confirmation that she   received preoperative antibiotics.  Midline incision was made over the first   metatarsophalangeal joint, dissected down.  The EHL was identified and   protected.  Looking into her toe joint, she had large dorsal osteophyte.  This   was removed with a microsagittal saw as well as medial and lateral.  She was   noted to have a plantar medial aspect of her metatarsal head, which had   approximately 8 mm diameter that was full thickness all the way down to bone.    The microsagittal saw was used to buff out and make cross sections in the   area for bone marrow stimulation.  Wounds were irrigated with copious   irrigation.  Osteophytes were taken off of the proximal aspect of the proximal   phalanx with a rongeur.  Bone wax was placed over the dorsal aspect of the   metatarsal head.  The wound was then closed in a layered fashion closing the   capsular layer with 3-0 Vicryl and 3-0 nylon for the skin.    Incision was made over the dorsal aspect of the third webspace, was dissected   down.  The intermetatarsal ligament was identified and a freer was placed just   deep to it.  A 15 blade was used to cut the intermetatarsal ligament.  This   exposed quite a large neuroma.  It was then traced well back into the   intrinsics where it was amputated and traced again distal past the bifurcation   where it was amputated with tissue scissors.  This was then sent off as   specimen.  The tourniquet was released.  Hemostasis was obtained.  The wound   was closed with interrupted nylon suture.  Sterile dressings were applied.    She was transferred to recovery room in good condition.    Utilization of Dr. Franco was necessary for patient positioning, holding,   retracting, wound closure, and dressing placement.  He was present throughout   the entire procedure.       ____________________________________     MD MANUEL RODRIGUEZ / LALO    DD:  11/30/2020  11:22:46  DT:  11/30/2020 12:04:19    D#:  [id number]  Job#:  [id number]

## 2020-11-30 NOTE — DISCHARGE INSTRUCTIONS
ACTIVITY: Rest and take it easy for the first 24 hours.  A responsible adult is recommended to remain with you during that time.  It is normal to feel sleepy.  We encourage you to not do anything that requires balance, judgment or coordination.    MILD FLU-LIKE SYMPTOMS ARE NORMAL. YOU MAY EXPERIENCE GENERALIZED MUSCLE ACHES, THROAT IRRITATION, HEADACHE AND/OR SOME NAUSEA.    FOR 24 HOURS DO NOT:  Drive, operate machinery or run household appliances.  Drink beer or alcoholic beverages.   Make important decisions or sign legal documents.    SPECIAL INSTRUCTIONS: *FOLLOW ANY INSTRUCTIONS GIVEN TO YOU BY DR. STRATTON - SEE PACKET.  Weight bearing as tolerated in hard sole sandal. Use crutches and ice & elevate.*    DIET: To avoid nausea, slowly advance diet as tolerated, avoiding spicy or greasy foods for the first day.  Add more substantial food to your diet according to your physician's instructions.  Babies can be fed formula or breast milk as soon as they are hungry.  INCREASE FLUIDS AND FIBER TO AVOID CONSTIPATION.    SURGICAL DRESSING/BATHING: *keep clean and dry**    FOLLOW-UP APPOINTMENT:  A follow-up appointment should be arranged with your doctor in **2 weeks at previously scheduled appointment*; call to schedule  DR. STRATTON [phone].    You should CALL YOUR PHYSICIAN if you develop:  Fever greater than 101 degrees F.  Pain not relieved by medication, or persistent nausea or vomiting.  Excessive bleeding (blood soaking through dressing) or unexpected drainage from the wound.  Extreme redness or swelling around the incision site, drainage of pus or foul smelling drainage.  Inability to urinate or empty your bladder within 8 hours.  Problems with breathing or chest pain.    You should call 911 if you develop problems with breathing or chest pain.  If you are unable to contact your doctor or surgical center, you should go to the nearest emergency room or urgent care center.  Physician's telephone #: *  [facility] [phone]**    If any questions arise, call your doctor.  If your doctor is not available, please feel free to call the Surgical Center at [phone]. The Contact Center is open Monday through Friday 7AM to 5PM and may speak to a nurse at [phone], or toll free at [phone].     A registered nurse may call you a few days after your surgery to see how you are doing after your procedure.    MEDICATIONS: Resume taking daily medication.  Take prescribed pain medication with food.  If no medication is prescribed, you may take non-aspirin pain medication if needed.  PAIN MEDICATION CAN BE VERY CONSTIPATING.  Take a stool softener or laxative such as senokot, pericolace, or milk of magnesia if needed.    Prescription given for Norco.  Last pain medication given at 11:08 am..    Start Aspirin 81 mg one tab, two times per day starting POD # 1 (Tuesday)    If your physician has prescribed pain medication that includes Acetaminophen (Tylenol), do not take additional Acetaminophen (Tylenol) while taking the prescribed medication.    Depression / Suicide Risk    As you are discharged from this [facility] facility, it is important to learn how to keep safe from harming yourself.    Recognize the warning signs:  · Abrupt changes in personality, positive or negative- including increase in energy   · Giving away possessions  · Change in eating patterns- significant weight changes-  positive or negative  · Change in sleeping patterns- unable to sleep or sleeping all the time   · Unwillingness or inability to communicate  · Depression  · Unusual sadness, discouragement and loneliness  · Talk of wanting to die  · Neglect of personal appearance   · Rebelliousness- reckless behavior  · Withdrawal from people/activities they love  · Confusion- inability to concentrate     If you or a loved one observes any of these behaviors or has concerns about self-harm, here's what you can do:  · Talk about it- your  feelings and reasons for harming yourself  · Remove any means that you might use to hurt yourself (examples: pills, rope, extension cords, firearm)  · Get professional help from the community (Mental Health, Substance Abuse, psychological counseling)  · Do not be alone:Call your Safe Contact- someone whom you trust who will be there for you.  · Call your local CRISIS HOTLINE 911-1092 or 943-897-2960  · Call your local Children's Mobile Crisis Response Team [place] (485) 390-3707 or www.Geomagic  · Call the toll free National Suicide Prevention Hotlines   · National Suicide Prevention Lifeline 242-543-VHPO (2116)  · National Hope Line Network 800-SUICIDE (255-5176)

## 2020-11-30 NOTE — ANESTHESIA PREPROCEDURE EVALUATION
Relevant Problems   ANESTHESIA (within normal limits)      PULMONARY (within normal limits)      NEURO (within normal limits)      CARDIAC (within normal limits)      GI (within normal limits)       (within normal limits)      ENDO (within normal limits)       Physical Exam    Airway   Mallampati: II  TM distance: >3 FB  Neck ROM: full       Cardiovascular - normal exam  Rhythm: regular  Rate: normal  (-) murmur     Dental - normal exam           Pulmonary - normal exam  Breath sounds clear to auscultation     Abdominal    Neurological - normal exam                 Anesthesia Plan    ASA 1       Plan - general       Airway plan will be LMA        Induction: intravenous    Postoperative Plan: Postoperative administration of opioids is intended.    Pertinent diagnostic labs and testing reviewed    Informed Consent:    Anesthetic plan and risks discussed with patient.    Use of blood products discussed with: patient whom consented to blood products.

## 2020-11-30 NOTE — ANESTHESIA PROCEDURE NOTES
Airway    Date/Time: 11/30/2020 10:16 AM  Performed by: Lv Resendez M.D.  Authorized by: Lv Resendez M.D.     Location:  OR  Urgency:  Elective  Indications for Airway Management:  Anesthesia      Spontaneous Ventilation: absent    Sedation Level:  Deep  Preoxygenated: Yes    Final Airway Type:  Supraglottic airway  Final Supraglottic Airway:  Standard LMA    SGA Size:  4  Number of Attempts at Approach:  1

## 2020-11-30 NOTE — OR NURSING
Report received from Penny HAMMER. Pt resting comfortably. Waiting for post-op boot. No needs at this time.

## 2020-11-30 NOTE — ANESTHESIA TIME REPORT
Anesthesia Start and Stop Event Times     Date Time Event    11/30/2020 0955 Ready for Procedure     1010 Anesthesia Start     1055 Anesthesia Stop        Responsible Staff  11/30/20    Name Role Begin End    Lv Resendez M.D. 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

## 2020-12-01 ASSESSMENT — PAIN SCALES - GENERAL: PAIN_LEVEL: 4

## 2020-12-01 NOTE — ANESTHESIA POSTPROCEDURE EVALUATION
Patient: Adeline Patel    Procedure Summary     Date: 11/30/20 Room / Location: [place] [address] / SURGERY [facility]    Anesthesia Start: 1010 Anesthesia Stop: 1055    Procedures:       EXCISION, NEUROMA- 3RD WEBSPACE (Right Foot)      EXCISION, BONE SPUR- CHEILECTOMY (Right First Toe) Diagnosis: (NEUROMA RIGHT FOOT, HALLUX RIGIDIOUS)    Surgeons: Jaun Cook M.D. Responsible Provider: Lv Resendez M.D.    Anesthesia Type: general ASA Status: 1          Final Anesthesia Type: general  Last vitals  BP   Blood Pressure : 114/55    Temp   36.7 °C (98.1 °F)    Pulse   Pulse: 72   Resp   14    SpO2   97 %      Anesthesia Post Evaluation    Patient location during evaluation: PACU  Patient participation: complete - patient participated  Level of consciousness: awake and alert  Pain score: 4    Airway patency: patent  Anesthetic complications: no  Cardiovascular status: hemodynamically stable  Respiratory status: acceptable  Hydration status: euvolemic    PONV: none           Nurse Pain Score: 4 (NPRS)

## 2020-12-02 ENCOUNTER — APPOINTMENT (RX ONLY)
Dept: URBAN - METROPOLITAN AREA CLINIC 20 | Facility: CLINIC | Age: 70
Setting detail: DERMATOLOGY
End: 2020-12-02

## 2020-12-02 DIAGNOSIS — L57.0 ACTINIC KERATOSIS: ICD-10-CM

## 2020-12-02 DIAGNOSIS — L82.1 OTHER SEBORRHEIC KERATOSIS: ICD-10-CM

## 2020-12-02 DIAGNOSIS — Z85.820 PERSONAL HISTORY OF MALIGNANT MELANOMA OF SKIN: ICD-10-CM

## 2020-12-02 DIAGNOSIS — D22 MELANOCYTIC NEVI: ICD-10-CM

## 2020-12-02 DIAGNOSIS — D18.0 HEMANGIOMA: ICD-10-CM

## 2020-12-02 DIAGNOSIS — L57.8 OTHER SKIN CHANGES DUE TO CHRONIC EXPOSURE TO NONIONIZING RADIATION: ICD-10-CM

## 2020-12-02 DIAGNOSIS — L81.4 OTHER MELANIN HYPERPIGMENTATION: ICD-10-CM

## 2020-12-02 PROBLEM — D22.5 MELANOCYTIC NEVI OF TRUNK: Status: ACTIVE | Noted: 2020-12-02

## 2020-12-02 PROBLEM — D18.01 HEMANGIOMA OF SKIN AND SUBCUTANEOUS TISSUE: Status: ACTIVE | Noted: 2020-12-02

## 2020-12-02 PROCEDURE — 17003 DESTRUCT PREMALG LES 2-14: CPT

## 2020-12-02 PROCEDURE — ? COUNSELING

## 2020-12-02 PROCEDURE — 99213 OFFICE O/P EST LOW 20 MIN: CPT | Mod: 25

## 2020-12-02 PROCEDURE — ? LIQUID NITROGEN

## 2020-12-02 PROCEDURE — 17000 DESTRUCT PREMALG LESION: CPT

## 2020-12-02 PROCEDURE — ? DIAGNOSIS COMMENT

## 2020-12-02 ASSESSMENT — LOCATION DETAILED DESCRIPTION DERM
LOCATION DETAILED: LEFT DORSAL HAND
LOCATION DETAILED: RIGHT ANTERIOR THIGH
LOCATION DETAILED: RIGHT DORSAL FOREARM
LOCATION DETAILED: LEFT ANTERIOR THIGH
LOCATION DETAILED: LEFT INFERIOR CENTRAL MALAR CHEEK
LOCATION DETAILED: LEFT CHEEK
LOCATION DETAILED: LEFT LATERAL PROXIMAL PRETIBIAL REGION
LOCATION DETAILED: LEFT SUPERIOR CENTRAL BUCCAL CHEEK
LOCATION DETAILED: LEFT CENTRAL ZYGOMA
LOCATION DETAILED: RIGHT DORSAL HAND
LOCATION DETAILED: RIGHT PROXIMAL DORSAL FOREARM
LOCATION DETAILED: RIGHT CHEEK
LOCATION DETAILED: LEFT UPPER BACK
LOCATION DETAILED: LEFT MID BACK
LOCATION DETAILED: LEFT DORSAL FOREARM

## 2020-12-02 ASSESSMENT — LOCATION SIMPLE DESCRIPTION DERM
LOCATION SIMPLE: LEFT PRETIBIAL REGION
LOCATION SIMPLE: RIGHT HAND
LOCATION SIMPLE: LEFT ZYGOMA
LOCATION SIMPLE: BACK
LOCATION SIMPLE: RIGHT UPPER EXTREMITY
LOCATION SIMPLE: RIGHT LOWER EXTREMITY
LOCATION SIMPLE: LEFT LOWER EXTREMITY
LOCATION SIMPLE: LEFT CHEEK
LOCATION SIMPLE: RIGHT FOREARM
LOCATION SIMPLE: RIGHT CHEEK
LOCATION SIMPLE: LEFT HAND
LOCATION SIMPLE: LEFT UPPER EXTREMITY

## 2020-12-02 ASSESSMENT — LOCATION ZONE DERM
LOCATION ZONE: FACE
LOCATION ZONE: LEG
LOCATION ZONE: ARM
LOCATION ZONE: HAND
LOCATION ZONE: TRUNK

## 2020-12-02 NOTE — HPI: MELANOMA F/U (HISTORY OF MALIGNANT MELANOMA)
What Is The Reason For Today's Visit?: History of Melanoma
Breslow Depth?: 0.2
Year Excised?: 11/2016

## 2021-01-15 DIAGNOSIS — Z23 NEED FOR VACCINATION: ICD-10-CM

## 2021-01-28 ENCOUNTER — IMMUNIZATION (OUTPATIENT)
Dept: FAMILY PLANNING/WOMEN'S HEALTH CLINIC | Facility: IMMUNIZATION CENTER | Age: 71
End: 2021-01-28
Attending: INTERNAL MEDICINE
Payer: MEDICARE

## 2021-01-28 DIAGNOSIS — Z23 ENCOUNTER FOR VACCINATION: Primary | ICD-10-CM

## 2021-01-28 DIAGNOSIS — Z23 NEED FOR VACCINATION: ICD-10-CM

## 2021-01-28 PROCEDURE — 0011A MODERNA SARS-COV-2 VACCINE: CPT

## 2021-01-28 PROCEDURE — 91301 MODERNA SARS-COV-2 VACCINE: CPT

## 2021-02-25 ENCOUNTER — IMMUNIZATION (OUTPATIENT)
Dept: FAMILY PLANNING/WOMEN'S HEALTH CLINIC | Facility: IMMUNIZATION CENTER | Age: 71
End: 2021-02-25
Attending: INTERNAL MEDICINE
Payer: MEDICARE

## 2021-02-25 DIAGNOSIS — Z23 ENCOUNTER FOR VACCINATION: Primary | ICD-10-CM

## 2021-02-25 PROCEDURE — 0012A MODERNA SARS-COV-2 VACCINE: CPT | Performed by: INTERNAL MEDICINE

## 2021-02-25 PROCEDURE — 91301 MODERNA SARS-COV-2 VACCINE: CPT | Performed by: INTERNAL MEDICINE

## 2021-06-02 ENCOUNTER — APPOINTMENT (RX ONLY)
Dept: URBAN - METROPOLITAN AREA CLINIC 20 | Facility: CLINIC | Age: 71
Setting detail: DERMATOLOGY
End: 2021-06-02

## 2021-06-02 DIAGNOSIS — D22 MELANOCYTIC NEVI: ICD-10-CM

## 2021-06-02 DIAGNOSIS — L57.8 OTHER SKIN CHANGES DUE TO CHRONIC EXPOSURE TO NONIONIZING RADIATION: ICD-10-CM

## 2021-06-02 DIAGNOSIS — D18.0 HEMANGIOMA: ICD-10-CM

## 2021-06-02 DIAGNOSIS — L81.4 OTHER MELANIN HYPERPIGMENTATION: ICD-10-CM

## 2021-06-02 DIAGNOSIS — Z85.820 PERSONAL HISTORY OF MALIGNANT MELANOMA OF SKIN: ICD-10-CM

## 2021-06-02 DIAGNOSIS — L82.1 OTHER SEBORRHEIC KERATOSIS: ICD-10-CM

## 2021-06-02 PROBLEM — D18.01 HEMANGIOMA OF SKIN AND SUBCUTANEOUS TISSUE: Status: ACTIVE | Noted: 2021-06-02

## 2021-06-02 PROBLEM — D22.5 MELANOCYTIC NEVI OF TRUNK: Status: ACTIVE | Noted: 2021-06-02

## 2021-06-02 PROCEDURE — ? DIAGNOSIS COMMENT

## 2021-06-02 PROCEDURE — ? COUNSELING

## 2021-06-02 PROCEDURE — 99213 OFFICE O/P EST LOW 20 MIN: CPT

## 2021-06-02 ASSESSMENT — LOCATION SIMPLE DESCRIPTION DERM
LOCATION SIMPLE: LEFT CHEEK
LOCATION SIMPLE: RIGHT FOREARM
LOCATION SIMPLE: RIGHT UPPER ARM
LOCATION SIMPLE: RIGHT CHEEK
LOCATION SIMPLE: RIGHT UPPER BACK
LOCATION SIMPLE: LEFT UPPER ARM
LOCATION SIMPLE: RIGHT THIGH
LOCATION SIMPLE: CHEST
LOCATION SIMPLE: LEFT FOREARM
LOCATION SIMPLE: LEFT THIGH

## 2021-06-02 ASSESSMENT — LOCATION DETAILED DESCRIPTION DERM
LOCATION DETAILED: RIGHT DISTAL DORSAL FOREARM
LOCATION DETAILED: RIGHT MID-UPPER BACK
LOCATION DETAILED: LEFT INFERIOR CENTRAL MALAR CHEEK
LOCATION DETAILED: RIGHT ANTERIOR DISTAL THIGH
LOCATION DETAILED: RIGHT CENTRAL MALAR CHEEK
LOCATION DETAILED: LEFT DISTAL DORSAL FOREARM
LOCATION DETAILED: LEFT MEDIAL SUPERIOR CHEST
LOCATION DETAILED: RIGHT PROXIMAL DORSAL FOREARM
LOCATION DETAILED: LEFT ANTERIOR PROXIMAL UPPER ARM
LOCATION DETAILED: RIGHT INFERIOR UPPER BACK
LOCATION DETAILED: RIGHT SUPERIOR MEDIAL UPPER BACK
LOCATION DETAILED: RIGHT ANTERIOR PROXIMAL UPPER ARM
LOCATION DETAILED: LEFT ANTERIOR DISTAL THIGH

## 2021-06-02 ASSESSMENT — LOCATION ZONE DERM
LOCATION ZONE: FACE
LOCATION ZONE: LEG
LOCATION ZONE: ARM
LOCATION ZONE: TRUNK

## 2021-06-02 NOTE — HPI: MELANOMA F/U (HISTORY OF MALIGNANT MELANOMA)
What Is The Reason For Today's Visit?: History of Melanoma
Breslow Depth?: 0.2mm
Year Excised?: 2016

## 2021-06-02 NOTE — PROCEDURE: MIPS QUALITY
Detail Level: Detailed
Quality 226: Preventive Care And Screening: Tobacco Use: Screening And Cessation Intervention: Patient screened for tobacco use and is an ex/non-smoker
Quality 431: Preventive Care And Screening: Unhealthy Alcohol Use - Screening: Patient screened for unhealthy alcohol use using a single question and scores less than 2 times per year
Quality 111:Pneumonia Vaccination Status For Older Adults: Pneumococcal Vaccination Previously Received
Quality 402: Tobacco Use And Help With Quitting Among Adolescents: Patient screened for tobacco and never smoked
Quality 130: Documentation Of Current Medications In The Medical Record: Current Medications Documented

## 2021-12-08 ENCOUNTER — APPOINTMENT (RX ONLY)
Dept: URBAN - METROPOLITAN AREA CLINIC 20 | Facility: CLINIC | Age: 71
Setting detail: DERMATOLOGY
End: 2021-12-08

## 2021-12-08 DIAGNOSIS — L81.4 OTHER MELANIN HYPERPIGMENTATION: ICD-10-CM

## 2021-12-08 DIAGNOSIS — D18.0 HEMANGIOMA: ICD-10-CM

## 2021-12-08 DIAGNOSIS — D22 MELANOCYTIC NEVI: ICD-10-CM

## 2021-12-08 DIAGNOSIS — L57.8 OTHER SKIN CHANGES DUE TO CHRONIC EXPOSURE TO NONIONIZING RADIATION: ICD-10-CM

## 2021-12-08 DIAGNOSIS — L82.1 OTHER SEBORRHEIC KERATOSIS: ICD-10-CM

## 2021-12-08 DIAGNOSIS — Z85.820 PERSONAL HISTORY OF MALIGNANT MELANOMA OF SKIN: ICD-10-CM

## 2021-12-08 PROBLEM — D22.5 MELANOCYTIC NEVI OF TRUNK: Status: ACTIVE | Noted: 2021-12-08

## 2021-12-08 PROBLEM — D18.01 HEMANGIOMA OF SKIN AND SUBCUTANEOUS TISSUE: Status: ACTIVE | Noted: 2021-12-08

## 2021-12-08 PROCEDURE — ? DIAGNOSIS COMMENT

## 2021-12-08 PROCEDURE — ? COUNSELING

## 2021-12-08 PROCEDURE — 99213 OFFICE O/P EST LOW 20 MIN: CPT

## 2021-12-08 ASSESSMENT — LOCATION SIMPLE DESCRIPTION DERM
LOCATION SIMPLE: RIGHT THIGH
LOCATION SIMPLE: LEFT FOREARM
LOCATION SIMPLE: RIGHT UPPER BACK
LOCATION SIMPLE: LEFT CHEEK
LOCATION SIMPLE: RIGHT CHEEK
LOCATION SIMPLE: CHEST
LOCATION SIMPLE: RIGHT FOREARM
LOCATION SIMPLE: RIGHT UPPER ARM
LOCATION SIMPLE: LEFT THIGH
LOCATION SIMPLE: LEFT UPPER ARM

## 2021-12-08 ASSESSMENT — LOCATION DETAILED DESCRIPTION DERM
LOCATION DETAILED: LEFT DISTAL DORSAL FOREARM
LOCATION DETAILED: RIGHT MID-UPPER BACK
LOCATION DETAILED: RIGHT SUPERIOR MEDIAL UPPER BACK
LOCATION DETAILED: LEFT MEDIAL SUPERIOR CHEST
LOCATION DETAILED: RIGHT INFERIOR UPPER BACK
LOCATION DETAILED: LEFT ANTERIOR DISTAL THIGH
LOCATION DETAILED: RIGHT CENTRAL MALAR CHEEK
LOCATION DETAILED: LEFT INFERIOR CENTRAL MALAR CHEEK
LOCATION DETAILED: LEFT ANTERIOR PROXIMAL UPPER ARM
LOCATION DETAILED: RIGHT PROXIMAL DORSAL FOREARM
LOCATION DETAILED: RIGHT DISTAL DORSAL FOREARM
LOCATION DETAILED: RIGHT ANTERIOR DISTAL THIGH
LOCATION DETAILED: RIGHT ANTERIOR PROXIMAL UPPER ARM

## 2021-12-08 ASSESSMENT — LOCATION ZONE DERM
LOCATION ZONE: ARM
LOCATION ZONE: LEG
LOCATION ZONE: TRUNK
LOCATION ZONE: FACE

## 2021-12-08 NOTE — HPI: MELANOMA F/U (HISTORY OF MALIGNANT MELANOMA)
What Is The Reason For Today's Visit?: Surveillance against skin cancer recurrences
Breslow Depth?: 0.2mm
Year Excised?: 2016

## 2022-05-26 ENCOUNTER — OFFICE VISIT (OUTPATIENT)
Dept: MEDICAL GROUP | Facility: CLINIC | Age: 72
End: 2022-05-26
Payer: MEDICARE

## 2022-05-26 VITALS
WEIGHT: 107 LBS | HEIGHT: 64 IN | TEMPERATURE: 97.8 F | BODY MASS INDEX: 18.27 KG/M2 | OXYGEN SATURATION: 96 % | HEART RATE: 80 BPM | SYSTOLIC BLOOD PRESSURE: 118 MMHG | DIASTOLIC BLOOD PRESSURE: 68 MMHG

## 2022-05-26 DIAGNOSIS — M85.80 OSTEOPENIA, UNSPECIFIED LOCATION: ICD-10-CM

## 2022-05-26 DIAGNOSIS — Z12.39 ENCOUNTER FOR SCREENING FOR MALIGNANT NEOPLASM OF BREAST, UNSPECIFIED SCREENING MODALITY: ICD-10-CM

## 2022-05-26 DIAGNOSIS — B00.1 COLD SORE: ICD-10-CM

## 2022-05-26 DIAGNOSIS — F32.9 REACTIVE DEPRESSION: Primary | ICD-10-CM

## 2022-05-26 DIAGNOSIS — M62.838 TRAPEZIUS MUSCLE SPASM: ICD-10-CM

## 2022-05-26 PROCEDURE — 99203 OFFICE O/P NEW LOW 30 MIN: CPT | Performed by: FAMILY MEDICINE

## 2022-05-26 RX ORDER — FLUOXETINE 20 MG/1
TABLET, FILM COATED ORAL
Qty: 100 TABLET | Refills: 0 | Status: SHIPPED
Start: 2022-05-26 | End: 2022-06-13

## 2022-05-26 RX ORDER — ACYCLOVIR 400 MG/1
400 TABLET ORAL EVERY MORNING
Qty: 100 TABLET | Refills: 3 | Status: SHIPPED | OUTPATIENT
Start: 2022-05-26 | End: 2023-06-22 | Stop reason: SDUPTHER

## 2022-05-26 ASSESSMENT — PATIENT HEALTH QUESTIONNAIRE - PHQ9
SUM OF ALL RESPONSES TO PHQ QUESTIONS 1-9: 7
5. POOR APPETITE OR OVEREATING: 0 - NOT AT ALL
CLINICAL INTERPRETATION OF PHQ2 SCORE: 6

## 2022-05-26 NOTE — LETTER
May 26, 2022    To Whom It May Concern:         This is confirmation that Adeline Patel attended her scheduled appointment with Janine Freitas M.D. on 5/26/22.    She is my patient and is followed for osteoprosis M81.0 and pain in the thoracic spine M43.6.  I have advised lifestyle modifications to include exercise at the gym.    Dx: Osteoporosis M81.0  Pain in thoracic spine M54.6         If you have any questions please do not hesitate to call me at the phone number listed below.    Sincerely,          Janine Freitas M.D.  [phone]

## 2022-05-26 NOTE — ASSESSMENT & PLAN NOTE
Chronic condition. Controlled.  She woldlike to continue acyclovir 400 mg one po qd.  She rarely has a cold sore outbreak, when one does then it does not last long.

## 2022-05-26 NOTE — ASSESSMENT & PLAN NOTE
Chronic condition. Uncontrolled.  She has done counseling which has not been very helpful.  She is interested in starting on pharmacotherapy.  Fluoxetine started.

## 2022-05-26 NOTE — PROGRESS NOTES
"CC:  Diagnoses of Osteopenia, unspecified location, Encounter for screening for malignant neoplasm of breast, unspecified screening modality, Cold sore, Reactive depression, and Trapezius muscle spasm were pertinent to this visit.    HISTORY OF THE PRESENT ILLNESS: Patient is a 72 y.o. female. This pleasant patient is here today to establish care.      Problem   Cold Sore    Often has cold sores when she was not on acyclovir.  No vaginal herpes.  She takes acyclovir 400 mg one po qd and then her cold sores are rare.     Reactive Depression    She feels less jaquelin.  No SI or HI. She states she used to be \"very happy.\"  She now is not happy.  She has felt down for months to years.      Trapezius Muscle Spasm    She has had chronic neck pain.  Her shoulders are tight.  She has seen Dr. Laura Arreola at [facility].           Allergies: Patient has no known allergies.    Current Outpatient Medications Ordered in Epic   Medication Sig Dispense Refill   • acyclovir (ZOVIRAX) 400 MG tablet Take 1 Tablet by mouth every morning. 100 Tablet 3   • fluoxetine (PROZAC) 20 MG tablet Take 1/2 pill per day by mouth x 7 days, then one tablet po qd 100 Tablet 0   • Calcium-Magnesium-Zinc (JAME-MAG-ZINC PO) Take 1 Tab by mouth every morning.     • vitamin D (CHOLECALCIFEROL) 1000 Unit (25 mcg) Tab Take 1,000 Units by mouth every morning.     • ibuprofen (MOTRIN) 200 MG Tab Take 400 mg by mouth every 6 hours as needed.       No current Epic-ordered facility-administered medications on file.       Past Medical History:   Diagnosis Date   • Arthritis     osteoarthritis (thumb and knees)    • Cancer (HCC) 2016    melanoma    • Cold sore        Patient Care Team              Janine Freitas M.D. PCP - General, Family Medicine [phone]     [address] [place] [address]    [facility] [phone]     [address] [place] [address]    [facility] [phone]     [address] [facility] [address] [place] " "[id number]           Past Surgical History:   Procedure Laterality Date   • NEUROMA EXCISION Right 11/30/2020    Procedure: EXCISION, NEUROMA- 3RD WEBSPACE;  Surgeon: Jaun Cook M.D.;  Location: SURGERY [facility];  Service: Orthopedics   • BONE SPUR EXCISION Right 11/30/2020    Procedure: EXCISION, BONE SPUR- CHEILECTOMY;  Surgeon: Jaun Cook M.D.;  Location: SURGERY [facility];  Service: Orthopedics   • INGUINAL HERNIA REPAIR Right 1990   • HYSTERECTOMY, TOTAL ABDOMINAL  1985       Social History     Tobacco Use   • Smoking status: Never Smoker   • Smokeless tobacco: Never Used   Vaping Use   • Vaping Use: Never used   Substance Use Topics   • Alcohol use: Yes     Alcohol/week: 0.6 oz     Types: 1 Glasses of wine per week     Comment: one per day    • Drug use: Never       Social History     Social History Narrative   • Not on file       Family History   Problem Relation Age of Onset   • Alcohol abuse Father        ROS:     - Constitutional:Negative for fever, chills, unexpected weight change, and fatigue/generalized weakness.     - HEENT: Negative for vision changes, hearing changes, ear pain, ear discharge, rhinorrhea, sinus congestion, sore throat    - Respiratory: Negative for cough, wheezing, and crackles.      - Cardiovascular: Negative for chest pain.    - Gastrointestinal: Negative for heartburn, nausea, vomiting, abdominal pain, hematochezia.    - Genitourinary: Negative for dysuria, polyuria.    - Musculoskeletal:Negative for myalgias, back pain.    - Skin:Negative for rash, itching.    - Neurological: Negative for dizziness, focal weakness.    - Endo/Heme/Allergies: Does not bruise/bleed easily.     - Psychiatric/Behavioral: Negative for depression, suicidal/homicidal ideation.        Exam: /68 (BP Location: Left arm)   Pulse 80   Temp 36.6 °C (97.8 °F)   Ht 1.626 m (5' 4\")   Wt 48.5 kg (107 lb)   SpO2 96%  Body mass index is 18.37 kg/m².    General: Normal appearing. No " distress.  HEENT: Normocephalic. Eyes conjunctiva clear lids without ptosis, pupils equal and reactive to light accommodation, ears normal shape and contour, canals are clear bilaterally, tympanic membranes are benign, nasal mucosa benign, oropharynx is without erythema, edema or exudates.   Neck: Thyroid is not enlarged. Trapezius muscles tender and tight bilaterally.  Pulmonary: Clear to ausculation.  Normal effort. No rales, ronchi, or wheezing.  Cardiovascular: Regular rate and rhythm without murmur.   Abdomen: Soft, nontender, nondistended. Normal bowel sounds.   Neurologic: Grossly nonfocal  Lymph: No cervical, supraclavicular or axillary lymph nodes are palpable  Skin: Warm and dry.  No obvious lesions.  Musculoskeletal: Normal gait.  Psych: Normal mood and affect. Alert and oriented x3. Judgment and insight is normal.    Assessment/Plan    72 y.o. female with the following-  Problem List Items Addressed This Visit     Cold sore     Chronic condition. Controlled.  She woldlike to continue acyclovir 400 mg one po qd.  She rarely has a cold sore outbreak, when one does then it does not last long.           Reactive depression     Chronic condition. Uncontrolled.  She has done counseling which has not been very helpful.  She is interested in starting on pharmacotherapy.  Fluoxetine started.            Relevant Medications    fluoxetine (PROZAC) 20 MG tablet    Trapezius muscle spasm     Chronic condition. Uncontrolled.  She will stretch, do heat and massage.  She will watch her neck placement.               Other Visit Diagnoses     Osteopenia, unspecified location        Relevant Orders    DS-BONE DENSITY STUDY (DEXA)    Encounter for screening for malignant neoplasm of breast, unspecified screening modality        Relevant Orders    MA-SCREENING MAMMO BILAT W/TOMOSYNTHESIS W/CAD        No follow-ups on file.

## 2022-05-26 NOTE — ASSESSMENT & PLAN NOTE
Chronic condition. Uncontrolled.  She will stretch, do heat and massage.  She will watch her neck placement.

## 2022-05-26 NOTE — PATIENT INSTRUCTIONS
"Buy theracane online to help massage neck.     The Sunday Read: \"The Joys (and Challenges of Sex After 70).  The Daily: January 30,2022.      Get covid booster vaccine #2.    "

## 2022-06-03 ENCOUNTER — HOSPITAL ENCOUNTER (OUTPATIENT)
Dept: RADIOLOGY | Facility: MEDICAL CENTER | Age: 72
End: 2022-06-03
Payer: MEDICARE

## 2022-06-13 DIAGNOSIS — F32.9 REACTIVE DEPRESSION: ICD-10-CM

## 2022-06-13 RX ORDER — FLUOXETINE HYDROCHLORIDE 20 MG/1
20 CAPSULE ORAL DAILY
Qty: 100 CAPSULE | Refills: 3 | Status: SHIPPED
Start: 2022-06-13 | End: 2022-08-17

## 2022-07-25 ENCOUNTER — HOSPITAL ENCOUNTER (OUTPATIENT)
Dept: RADIOLOGY | Facility: MEDICAL CENTER | Age: 72
End: 2022-07-25
Attending: FAMILY MEDICINE
Payer: MEDICARE

## 2022-07-25 DIAGNOSIS — M85.88 OTHER SPECIFIED DISORDERS OF BONE DENSITY AND STRUCTURE, OTHER SITE: ICD-10-CM

## 2022-07-25 DIAGNOSIS — Z12.39 ENCOUNTER FOR SCREENING FOR MALIGNANT NEOPLASM OF BREAST, UNSPECIFIED SCREENING MODALITY: ICD-10-CM

## 2022-07-25 DIAGNOSIS — M85.80 OSTEOPENIA, UNSPECIFIED LOCATION: ICD-10-CM

## 2022-07-25 PROCEDURE — 77080 DXA BONE DENSITY AXIAL: CPT

## 2022-08-03 ENCOUNTER — APPOINTMENT (RX ONLY)
Dept: URBAN - METROPOLITAN AREA CLINIC 20 | Facility: CLINIC | Age: 72
Setting detail: DERMATOLOGY
End: 2022-08-03

## 2022-08-03 DIAGNOSIS — D18.0 HEMANGIOMA: ICD-10-CM

## 2022-08-03 DIAGNOSIS — L82.1 OTHER SEBORRHEIC KERATOSIS: ICD-10-CM

## 2022-08-03 DIAGNOSIS — D22 MELANOCYTIC NEVI: ICD-10-CM

## 2022-08-03 DIAGNOSIS — L81.4 OTHER MELANIN HYPERPIGMENTATION: ICD-10-CM

## 2022-08-03 DIAGNOSIS — Z85.820 PERSONAL HISTORY OF MALIGNANT MELANOMA OF SKIN: ICD-10-CM

## 2022-08-03 DIAGNOSIS — L57.8 OTHER SKIN CHANGES DUE TO CHRONIC EXPOSURE TO NONIONIZING RADIATION: ICD-10-CM

## 2022-08-03 PROBLEM — D22.5 MELANOCYTIC NEVI OF TRUNK: Status: ACTIVE | Noted: 2022-08-03

## 2022-08-03 PROBLEM — D18.01 HEMANGIOMA OF SKIN AND SUBCUTANEOUS TISSUE: Status: ACTIVE | Noted: 2022-08-03

## 2022-08-03 PROCEDURE — ? COUNSELING

## 2022-08-03 PROCEDURE — ? DIAGNOSIS COMMENT

## 2022-08-03 PROCEDURE — 99213 OFFICE O/P EST LOW 20 MIN: CPT

## 2022-08-03 ASSESSMENT — LOCATION DETAILED DESCRIPTION DERM
LOCATION DETAILED: RIGHT MID-UPPER BACK
LOCATION DETAILED: RIGHT CENTRAL MALAR CHEEK
LOCATION DETAILED: RIGHT PROXIMAL DORSAL FOREARM
LOCATION DETAILED: RIGHT DISTAL DORSAL FOREARM
LOCATION DETAILED: LEFT ANTERIOR DISTAL THIGH
LOCATION DETAILED: LEFT INFERIOR CENTRAL MALAR CHEEK
LOCATION DETAILED: RIGHT SUPERIOR MEDIAL UPPER BACK
LOCATION DETAILED: LEFT DISTAL DORSAL FOREARM
LOCATION DETAILED: RIGHT INFERIOR UPPER BACK
LOCATION DETAILED: RIGHT ANTERIOR DISTAL THIGH
LOCATION DETAILED: LEFT ANTERIOR PROXIMAL UPPER ARM
LOCATION DETAILED: LEFT MEDIAL SUPERIOR CHEST
LOCATION DETAILED: RIGHT ANTERIOR PROXIMAL UPPER ARM

## 2022-08-03 ASSESSMENT — LOCATION SIMPLE DESCRIPTION DERM
LOCATION SIMPLE: RIGHT THIGH
LOCATION SIMPLE: CHEST
LOCATION SIMPLE: RIGHT FOREARM
LOCATION SIMPLE: LEFT FOREARM
LOCATION SIMPLE: RIGHT CHEEK
LOCATION SIMPLE: RIGHT UPPER ARM
LOCATION SIMPLE: LEFT CHEEK
LOCATION SIMPLE: LEFT UPPER ARM
LOCATION SIMPLE: LEFT THIGH
LOCATION SIMPLE: RIGHT UPPER BACK

## 2022-08-03 ASSESSMENT — LOCATION ZONE DERM
LOCATION ZONE: TRUNK
LOCATION ZONE: ARM
LOCATION ZONE: FACE
LOCATION ZONE: LEG

## 2022-08-15 ENCOUNTER — HOSPITAL ENCOUNTER (OUTPATIENT)
Dept: RADIOLOGY | Facility: MEDICAL CENTER | Age: 72
End: 2022-08-15
Attending: FAMILY MEDICINE
Payer: MEDICARE

## 2022-08-15 DIAGNOSIS — Z12.31 VISIT FOR SCREENING MAMMOGRAM: ICD-10-CM

## 2022-08-15 PROCEDURE — 77063 BREAST TOMOSYNTHESIS BI: CPT

## 2022-08-17 ENCOUNTER — OFFICE VISIT (OUTPATIENT)
Dept: MEDICAL GROUP | Facility: CLINIC | Age: 72
End: 2022-08-17
Payer: MEDICARE

## 2022-08-17 VITALS
WEIGHT: 107 LBS | BODY MASS INDEX: 18.27 KG/M2 | HEART RATE: 66 BPM | TEMPERATURE: 98 F | SYSTOLIC BLOOD PRESSURE: 128 MMHG | HEIGHT: 64 IN | DIASTOLIC BLOOD PRESSURE: 72 MMHG | RESPIRATION RATE: 18 BRPM | OXYGEN SATURATION: 96 %

## 2022-08-17 DIAGNOSIS — F32.9 REACTIVE DEPRESSION: Primary | ICD-10-CM

## 2022-08-17 DIAGNOSIS — M81.8 OTHER OSTEOPOROSIS WITHOUT CURRENT PATHOLOGICAL FRACTURE: ICD-10-CM

## 2022-08-17 PROCEDURE — 99214 OFFICE O/P EST MOD 30 MIN: CPT | Performed by: FAMILY MEDICINE

## 2022-08-17 RX ORDER — PAROXETINE HYDROCHLORIDE 20 MG/1
20 TABLET, FILM COATED ORAL DAILY
Qty: 100 TABLET | Refills: 3 | Status: SHIPPED
Start: 2022-08-17 | End: 2023-02-22

## 2022-08-17 NOTE — ASSESSMENT & PLAN NOTE
Chronic condition. Uncontrolled.  She feels insomnia and not markedly better.  She would like to change medications.  Paroxetine started to possibly help with sleep and mood.

## 2022-08-17 NOTE — PROGRESS NOTES
"  HPI:  Patient is a 72 y.o. female. This pleasant patient is here today to discuss two issues.  Problem   Other Osteoporosis Without Current Pathological Fracture    DEXA T -2.6.  She walks, plays golf, plays tennis.  No previous fractures.  She takes calcium and vitamin D.     Reactive Depression    She feels less jaquelin.  No SI or HI. She states she used to be \"very happy.\"  She now is not happy.  She has felt down for months to years. She started on prozac 10 mg one poq d x 7 days, then increased to 20 mg a day.  She felt loopy the first week and then on the whole pill she felt better.  She is now having insomnia which seems worse now than beforer.   It is difficult to maintain asleep.   She routinely goes to bed at 10:30 and wakes at 1:30 am and will doze until 4:30 am.  At 4:30 am she is up for the day.   Her mind does not quiet down.                                                                                                                                     Patient Active Problem List    Diagnosis Date Noted    Other osteoporosis without current pathological fracture 08/17/2022    Cold sore 05/26/2022    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/26/2022    Trapezius muscle spasm 05/26/2022       Current Outpatient Medications   Medication Sig Dispense Refill    PARoxetine (PAXIL) 20 MG Tab Take 1 Tablet by mouth every day. 100 Tablet 3    acyclovir (ZOVIRAX) 400 MG tablet Take 1 Tablet by mouth every morning. 100 Tablet 3    Calcium-Magnesium-Zinc (JAME-MAG-ZINC PO) Take 1 Tab by mouth every morning.      vitamin D (CHOLECALCIFEROL) 1000 Unit (25 mcg) Tab Take 1,000 Units by mouth every morning.       No current facility-administered medications for this visit.         Allergies as of 08/17/2022    (No Known Allergies)          /72 (BP Location: Right arm, Patient Position: Sitting, BP Cuff Size: Small adult)   Pulse 66   Temp 36.7 °C (98 °F)   Resp 18   Ht 1.626 m (5' 4\")   Wt 48.5 kg (107 lb)   SpO2 96%   BMI " 18.37 kg/m²     Gen: no fevers/chills, no changes in weight  Eyes: no changes in vision  ENT: no sore throat, no hearing loss, no bloody nose  Pulm: no sob, no cough  CV: no chest pain, no palpitations  GI: no nausea/vomiting, no diarrhea  : no dysuria or flank pain  MSk: no myalgias  Skin: no rash  Psych: no SI or HI.   Neuro: no headaches, no numbness/tingling  Heme/Lymph: no easy bruising or bleeding    Physical Exam:  Gen:         Alert and oriented, No apparent distress.  Neck:        No Lymphadenopathy or Bruits.  Lungs:     Clear to auscultation bilaterally  CV:           Regular rate and rhythm. No murmurs, rubs or gallops.    Abdomen: soft, nontender, nondistended.    Skin:      no rash or exudate             Psych: mood appropriate.   Ext:          No clubbing, cyanosis, edema.      Assessment and Plan    72 y.o. female with the following-  Problem List Items Addressed This Visit       Reactive depression     Chronic condition. Uncontrolled.  She feels insomnia and not markedly better.  She would like to change medications.  Paroxetine started to possibly help with sleep and mood.          Relevant Medications    PARoxetine (PAXIL) 20 MG Tab    Other osteoporosis without current pathological fracture     Chronic condition. Uncontrolled.  She had fosamax before for what she thinks was 5 years.  Referral for endocrine for possible infusion. Continue with weight bearing exercise.         Relevant Orders    Referral to Endocrinology     No follow-ups on file.

## 2022-08-17 NOTE — ASSESSMENT & PLAN NOTE
Chronic condition. Uncontrolled.  She had fosamax before for what she thinks was 5 years.  Referral for endocrine for possible infusion. Continue with weight bearing exercise.

## 2022-11-09 ENCOUNTER — PATIENT MESSAGE (OUTPATIENT)
Dept: HEALTH INFORMATION MANAGEMENT | Facility: OTHER | Age: 72
End: 2022-11-09

## 2023-02-08 ENCOUNTER — APPOINTMENT (RX ONLY)
Dept: URBAN - METROPOLITAN AREA CLINIC 20 | Facility: CLINIC | Age: 73
Setting detail: DERMATOLOGY
End: 2023-02-08

## 2023-02-08 DIAGNOSIS — L81.4 OTHER MELANIN HYPERPIGMENTATION: ICD-10-CM

## 2023-02-08 DIAGNOSIS — L82.1 OTHER SEBORRHEIC KERATOSIS: ICD-10-CM

## 2023-02-08 DIAGNOSIS — L57.0 ACTINIC KERATOSIS: ICD-10-CM | Status: INADEQUATELY CONTROLLED

## 2023-02-08 DIAGNOSIS — D22 MELANOCYTIC NEVI: ICD-10-CM

## 2023-02-08 DIAGNOSIS — L57.8 OTHER SKIN CHANGES DUE TO CHRONIC EXPOSURE TO NONIONIZING RADIATION: ICD-10-CM

## 2023-02-08 DIAGNOSIS — Z85.820 PERSONAL HISTORY OF MALIGNANT MELANOMA OF SKIN: ICD-10-CM

## 2023-02-08 DIAGNOSIS — D18.0 HEMANGIOMA: ICD-10-CM

## 2023-02-08 PROBLEM — D22.5 MELANOCYTIC NEVI OF TRUNK: Status: ACTIVE | Noted: 2023-02-08

## 2023-02-08 PROBLEM — D18.01 HEMANGIOMA OF SKIN AND SUBCUTANEOUS TISSUE: Status: ACTIVE | Noted: 2023-02-08

## 2023-02-08 PROBLEM — D22.39 MELANOCYTIC NEVI OF OTHER PARTS OF FACE: Status: ACTIVE | Noted: 2023-02-08

## 2023-02-08 PROCEDURE — ? COUNSELING

## 2023-02-08 PROCEDURE — 99213 OFFICE O/P EST LOW 20 MIN: CPT | Mod: 25

## 2023-02-08 PROCEDURE — ? DIAGNOSIS COMMENT

## 2023-02-08 PROCEDURE — 17000 DESTRUCT PREMALG LESION: CPT

## 2023-02-08 PROCEDURE — ? LIQUID NITROGEN

## 2023-02-08 ASSESSMENT — LOCATION SIMPLE DESCRIPTION DERM
LOCATION SIMPLE: LEFT CHEEK
LOCATION SIMPLE: LEFT TEMPLE
LOCATION SIMPLE: ABDOMEN
LOCATION SIMPLE: CHEST
LOCATION SIMPLE: RIGHT UPPER BACK
LOCATION SIMPLE: RIGHT CHEEK
LOCATION SIMPLE: RIGHT THIGH
LOCATION SIMPLE: RIGHT FOREARM
LOCATION SIMPLE: LEFT UPPER ARM
LOCATION SIMPLE: LEFT FOREARM
LOCATION SIMPLE: RIGHT UPPER ARM
LOCATION SIMPLE: LEFT THIGH

## 2023-02-08 ASSESSMENT — LOCATION DETAILED DESCRIPTION DERM
LOCATION DETAILED: RIGHT CENTRAL BUCCAL CHEEK
LOCATION DETAILED: RIGHT MEDIAL MALAR CHEEK
LOCATION DETAILED: RIGHT LATERAL ABDOMEN
LOCATION DETAILED: RIGHT ANTERIOR DISTAL THIGH
LOCATION DETAILED: LEFT CENTRAL TEMPLE
LOCATION DETAILED: LEFT ANTERIOR PROXIMAL UPPER ARM
LOCATION DETAILED: LEFT DISTAL DORSAL FOREARM
LOCATION DETAILED: LEFT MEDIAL SUPERIOR CHEST
LOCATION DETAILED: RIGHT INFERIOR UPPER BACK
LOCATION DETAILED: LEFT ANTERIOR DISTAL THIGH
LOCATION DETAILED: RIGHT DISTAL DORSAL FOREARM
LOCATION DETAILED: RIGHT MID-UPPER BACK
LOCATION DETAILED: RIGHT ANTERIOR PROXIMAL UPPER ARM
LOCATION DETAILED: RIGHT PROXIMAL DORSAL FOREARM
LOCATION DETAILED: RIGHT CENTRAL MALAR CHEEK
LOCATION DETAILED: LEFT INFERIOR CENTRAL MALAR CHEEK
LOCATION DETAILED: RIGHT SUPERIOR MEDIAL UPPER BACK

## 2023-02-08 ASSESSMENT — LOCATION ZONE DERM
LOCATION ZONE: ARM
LOCATION ZONE: LEG
LOCATION ZONE: FACE
LOCATION ZONE: TRUNK

## 2023-02-22 ENCOUNTER — OFFICE VISIT (OUTPATIENT)
Dept: ENDOCRINOLOGY | Facility: MEDICAL CENTER | Age: 73
End: 2023-02-22
Payer: MEDICARE

## 2023-02-22 VITALS
SYSTOLIC BLOOD PRESSURE: 118 MMHG | HEART RATE: 68 BPM | BODY MASS INDEX: 19.12 KG/M2 | HEIGHT: 64 IN | WEIGHT: 112 LBS | OXYGEN SATURATION: 98 % | DIASTOLIC BLOOD PRESSURE: 63 MMHG

## 2023-02-22 DIAGNOSIS — E55.9 VITAMIN D DEFICIENCY: ICD-10-CM

## 2023-02-22 DIAGNOSIS — M81.0 AGE-RELATED OSTEOPOROSIS WITHOUT CURRENT PATHOLOGICAL FRACTURE: ICD-10-CM

## 2023-02-22 PROCEDURE — 99204 OFFICE O/P NEW MOD 45 MIN: CPT

## 2023-02-22 PROCEDURE — 99211 OFF/OP EST MAY X REQ PHY/QHP: CPT

## 2023-02-22 RX ORDER — ALENDRONATE SODIUM 70 MG/1
70 TABLET ORAL
Qty: 4 TABLET | Refills: 3 | Status: SHIPPED
Start: 2023-02-22 | End: 2023-06-19

## 2023-02-22 ASSESSMENT — FIBROSIS 4 INDEX: FIB4 SCORE: 1.58

## 2023-02-22 NOTE — PROGRESS NOTES
Chief Complaint: Consult requested by Janine Freitas M.D. for evaluation of Osteoporosis/Osteopenia/Bone Loss    Subjective:      Adeline Patel is a 72 y.o. female who I am asked to see in consultation for evaluation osteoporosis. She was diagnosed with osteoporosis by bone density scan in July 2022.     Patient denies history of fracture.   Patient denies history of height loss.     The cause of osteoporosis is felt to be due to postmenopausal estrogen deficiency and family history of osteoporosis.   She is currently being treated with calcium and vitamin D supplementation. She is not currently being treated with bisphosphonates   She had fosamax before for what she thinks was 5 years with Dr Coppola over 15 years ago. In late 50-early 60's she was told to stop as she was taking the med for several years.     Osteoporosis Risk Factors   Nonmodifiable  Personal Hx of fracture as an adult: no  Hx of fracture in first-degree relative: no   race: yes  Advanced age: yes  Female sex: yes  Dementia: no  Poor health/frailty: no     Potentially modifiable:  Tobacco use: no  Low body weight (<127 lbs): yes  Estrogen deficiency     early menopause (age <45) or bilateral ovariectomy: yes- Hysterectomy at age 33. Menopause mid 40's      prolonged premenopausal amenorrhea (>1 yr): no  Low calcium intake (lifelong): no  Alcoholism: yes  Recurrent falls: no  Inadequate physical activity: yes, bike riding, gold, walk, hike, yoga, tennis     Current calcium and Vit D intake:  Dietary sources: vegetables, cheese, butter, cottage cheese, sour cream  supplements: Calcium - dose unknown  Vitamin D - dose unknown     Dexa Scan on 7/25/2022 showed lumbar spine has a mean bone mineral density of 1.013 g/cm2, with a T score of -1.3 and a Z score of 1.0. Proximal left femur has a mean bone mineral density of 0.689 g/cm2, with a T score of -2.5 and a Z score of -0.5.    Compared with the most recent study dated 6/5/2020,  there has been a 4.0% decrease in the bone mineral density of the proximal left femur.  IMPRESSION:     According to the World Health Organization classification, bone mineral density of this patient is osteopenic in the spine and osteoporotic in the femur.     10-year Probability of Fracture:  Major Osteoporotic     14.2%  Hip     4.9%  Population      [place] ()     Based on left femur neck BMD   Latest Reference Range & Units 09/23/22 04:12   Calcium 8.7 - 10.3 mg/dL 9.1      Latest Reference Range & Units 09/23/22 04:12   Creatinine 0.57 - 1.00 mg/dL 0.69      Latest Reference Range & Units 09/23/22 04:12   Alkaline Phosphatase 44 - 121 IU/L 78      Latest Reference Range & Units 09/23/22 04:12   TSH 0.450 - 4.500 uIU/mL 2.290     Patient's medications, allergies, and social histories were reviewed and updated as appropriate.      ROS:     CONS:     No fever, no chills, no weight loss, no fatigue   EYES:      No diplopia, no blurry vision, no redness of eyes, no swelling of eyelids   ENT:    No hearing loss, No ear pain, No sore throat, no dysphagia, no neck swelling   CV:     No chest pain, no palpitations, no claudication, no orthopnea, no PND   PULM:    No SOB, no cough, no hemoptysis, no wheezing    GI:   No nausea, no vomiting, no diarrhea, no constipation, no bloody stools   :  Passing urine well, no dysuria, no hematuria   ENDO:   No polyuria, no polydipsia, no heat intolerance, no cold intolerance   NEURO: No headaches, no dizziness, no convulsions, no tremors   MUSC:  No joint swellings, no arthralgias, no myalgias, no weakness   SKIN:   No rash, no ulcers, no dry skin   PSYCH:   No depression, no anxiety, no difficulty sleeping       Past Medical History:  Patient Active Problem List    Diagnosis Date Noted    Other osteoporosis without current pathological fracture 08/17/2022    Cold sore 05/26/2022    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/26/2022    Trapezius muscle spasm 05/26/2022       Past Surgical  History:  Past Surgical History:   Procedure Laterality Date    NEUROMA EXCISION Right 11/30/2020    Procedure: EXCISION, NEUROMA- 3RD WEBSPACE;  Surgeon: Jaun Cook M.D.;  Location: SURGERY [facility];  Service: Orthopedics    BONE SPUR EXCISION Right 11/30/2020    Procedure: EXCISION, BONE SPUR- CHEILECTOMY;  Surgeon: Jaun Cook M.D.;  Location: SURGERY [facility];  Service: Orthopedics    INGUINAL HERNIA REPAIR Right 1990    HYSTERECTOMY, TOTAL ABDOMINAL  1985        Allergies:  Patient has no known allergies.     Current Medications:    Current Outpatient Medications:     alendronate (FOSAMAX) 70 MG Tab, Take 1 Tablet by mouth every 7 days., Disp: 4 Tablet, Rfl: 3    acyclovir (ZOVIRAX) 400 MG tablet, Take 1 Tablet by mouth every morning., Disp: 100 Tablet, Rfl: 3    Calcium-Magnesium-Zinc (JAME-MAG-ZINC PO), Take 1 Tab by mouth every morning., Disp: , Rfl:     vitamin D (CHOLECALCIFEROL) 1000 Unit (25 mcg) Tab, Take 1,000 Units by mouth every morning., Disp: , Rfl:     PARoxetine (PAXIL) 20 MG Tab, Take 1 Tablet by mouth every day., Disp: 100 Tablet, Rfl: 3    Social History:  Social History     Socioeconomic History    Marital status:      Spouse name: Not on file    Number of children: 1    Years of education: Not on file    Highest education level: Not on file   Occupational History    Occupation: teacher, retired   Tobacco Use    Smoking status: Never    Smokeless tobacco: Never   Vaping Use    Vaping Use: Never used   Substance and Sexual Activity    Alcohol use: Yes     Alcohol/week: 0.6 oz     Types: 1 Glasses of wine per week     Comment: one per day     Drug use: Never    Sexual activity: Not Currently     Comment:  does not want to   Other Topics Concern    Not on file   Social History Narrative    Not on file     Social Determinants of Health     Financial Resource Strain: Not on file   Food Insecurity: Not on file   Transportation Needs: Not on file   Physical Activity:  "Not on file   Stress: Not on file   Social Connections: Not on file   Intimate Partner Violence: Not on file   Housing Stability: Not on file        Family History:   Family History   Problem Relation Age of Onset    Alcohol abuse Father          PHYSICAL EXAM:   Vital signs: /63 (BP Location: Left arm, Patient Position: Sitting, BP Cuff Size: Adult long)   Pulse 68   Ht 1.626 m (5' 4\")   Wt 50.8 kg (112 lb)   SpO2 98%   BMI 19.22 kg/m²   GENERAL: Well-developed, well-nourished  in no apparent distress.   EYE: No ocular and eyelid asymmetry, Anicteric sclerae,  PERRL, No exophthalmos or lidlag  HENT: Hearing grossly intact, Normocephalic, atraumatic. Pink, moist mucous membranes, No exudate  NECK: Supple. Trachea midline.   CARDIOVASCULAR: Regular rate and rhythm. No murmurs, rubs, or gallops.   LUNGS: Clear to auscultation bilaterally   ABDOMEN: Soft, nontender with positive bowel sounds.   EXTREMITIES: No clubbing, cyanosis, or edema.   NEUROLOGICAL: Cranial nerves II-XII are grossly intact   Symmetric reflexes at the patella no proximal muscle weakness, No visible tremor with both outstretched hands  LYMPH: No cervical, supraclavicular,  adenopathy palpated.   SKIN: No rashes, lesions. Turgor is normal.    Labs:  Lab Results   Component Value Date/Time    WBC 5.1 09/23/2022 04:12 AM    RBC 4.29 09/23/2022 04:12 AM    HEMOGLOBIN 13.4 09/23/2022 04:12 AM    MCV 94 09/23/2022 04:12 AM    MCH 31.2 09/23/2022 04:12 AM    MCHC 33.1 09/23/2022 04:12 AM    RDW 13.1 09/23/2022 04:12 AM       Lab Results   Component Value Date/Time    SODIUM 143 09/23/2022 04:12 AM    POTASSIUM 4.3 09/23/2022 04:12 AM    CHLORIDE 106 09/23/2022 04:12 AM    CO2 23 09/23/2022 04:12 AM    GLUCOSE 104 (H) 09/23/2022 04:12 AM    BUN 19 09/23/2022 04:12 AM    CREATININE 0.69 09/23/2022 04:12 AM    CALCIUM 9.1 09/23/2022 04:12 AM    ASTSGOT 22 09/23/2022 04:12 AM    ALTSGPT 16 09/23/2022 04:12 AM    TBILIRUBIN 0.5 09/23/2022 04:12 AM "    ALBUMIN 4.5 09/23/2022 04:12 AM    TOTPROTEIN 6.4 09/23/2022 04:12 AM    GLOBULIN 1.9 09/23/2022 04:12 AM    AGRATIO 2.4 (H) 09/23/2022 04:12 AM       No results found for: TSHULTRASEN  No results found for: FREET4  No results found for: FREET3  No results found for: THYSTIMIG      Imaging:    Bone Density: Spine T Score: -1.5, Proximal Left Femur T Score: -2.5       ASSESSMENT/PLAN:     1. Age-related osteoporosis without current pathological fracture  Unstable  Recommend Fosamax 70mg every 7 days  Patient understands to sit upright 30-60mins after taking the medication.   Side effects of new medications were discussed with the patient today in the office. The patient was supplied paperwork on this new medication.  Reviewed Dexa Scan with patient. Pt is osteopenic in lumbar spine with T score of -1.5 and Osteoporotic in Proximal Left femur with T score of -2.5. There is 4.0% decrease in BMD compared to Dexa scan from 6/2020.   Recommend [facility] for additional resources.   Continue daily weightbearing exercises.   - Comp Metabolic Panel; Future  - PHOSPHORUS; Future  - Urine Calcium 24 HR or Random; Future  - CTX COLLAGEN TYPE 1; Future  - N-TELOPEPTIDE SERUM CROSS-LINKED (ARUP); Future  - OSTEOCALCIN, SERUM; Future  - alendronate (FOSAMAX) 70 MG Tab; Take 1 Tablet by mouth every 7 days.  Dispense: 4 Tablet; Refill: 3    2. Vitamin D deficiency  Stable  Continue current regimen.   Patient will my chart message me the dose she is currently taking for vitamin D and calcium.   - VITAMIN D,25 HYDROXY (DEFICIENCY); Future     Return in about 2 months (around 4/22/2023). Patient will have blood work done 1 week prior to next apt in 2 months.       Thank you kindly for allowing me to participate in the endocrine care plan for this patient.    Luis A Mills, APRN   02/22/23    CC:   Janine Freitas M.D.

## 2023-04-25 LAB
25(OH)D3+25(OH)D2 SERPL-MCNC: 38.2 NG/ML (ref 30–100)
ALBUMIN SERPL-MCNC: 4.8 G/DL (ref 3.7–4.7)
ALBUMIN/GLOB SERPL: 2.4 {RATIO} (ref 1.2–2.2)
ALP SERPL-CCNC: 72 IU/L (ref 44–121)
ALT SERPL-CCNC: 15 IU/L (ref 0–32)
AST SERPL-CCNC: 21 IU/L (ref 0–40)
BILIRUB SERPL-MCNC: 0.6 MG/DL (ref 0–1.2)
BUN SERPL-MCNC: 13 MG/DL (ref 8–27)
BUN/CREAT SERPL: 18 (ref 12–28)
CALCIUM 24H UR-MCNC: 3.4 MG/DL
CALCIUM SERPL-MCNC: 9 MG/DL (ref 8.7–10.3)
CHLORIDE SERPL-SCNC: 106 MMOL/L (ref 96–106)
CO2 SERPL-SCNC: 22 MMOL/L (ref 20–29)
COLLAGEN CTX SERPL-MCNC: 276 PG/ML
COLLAGEN NTX UR-SCNC: 204 NMOL BCE
COLLAGEN NTX/CREAT UR-SRTO: 36 NM BCE/MM CR (ref 0–89)
CREAT SERPL-MCNC: 0.72 MG/DL (ref 0.57–1)
CREAT UR-MCNC: 63.8 MG/DL
EGFRCR SERPLBLD CKD-EPI 2021: 88 ML/MIN/1.73
GLOBULIN SER CALC-MCNC: 2 G/DL (ref 1.5–4.5)
GLUCOSE SERPL-MCNC: 102 MG/DL (ref 70–99)
Lab: NORMAL
OSTEOCALCIN SERPL-MCNC: 17.9 NG/ML
PHOSPHATE SERPL-MCNC: 3.7 MG/DL (ref 3–4.3)
POTASSIUM SERPL-SCNC: 4.1 MMOL/L (ref 3.5–5.2)
PROT SERPL-MCNC: 6.8 G/DL (ref 6–8.5)
SODIUM SERPL-SCNC: 142 MMOL/L (ref 134–144)
T4 FREE SERPL-MCNC: 1.22 NG/DL (ref 0.82–1.77)
TSH SERPL DL<=0.005 MIU/L-ACNC: 2.7 UIU/ML (ref 0.45–4.5)

## 2023-06-19 ENCOUNTER — OFFICE VISIT (OUTPATIENT)
Dept: ENDOCRINOLOGY | Facility: MEDICAL CENTER | Age: 73
End: 2023-06-19
Payer: MEDICARE

## 2023-06-19 VITALS
HEART RATE: 80 BPM | SYSTOLIC BLOOD PRESSURE: 102 MMHG | BODY MASS INDEX: 18.78 KG/M2 | WEIGHT: 110 LBS | DIASTOLIC BLOOD PRESSURE: 60 MMHG | HEIGHT: 64 IN | OXYGEN SATURATION: 98 %

## 2023-06-19 DIAGNOSIS — E55.9 VITAMIN D DEFICIENCY: ICD-10-CM

## 2023-06-19 DIAGNOSIS — M81.0 AGE-RELATED OSTEOPOROSIS WITHOUT CURRENT PATHOLOGICAL FRACTURE: ICD-10-CM

## 2023-06-19 PROCEDURE — 99212 OFFICE O/P EST SF 10 MIN: CPT

## 2023-06-19 PROCEDURE — 3078F DIAST BP <80 MM HG: CPT

## 2023-06-19 PROCEDURE — 3074F SYST BP LT 130 MM HG: CPT

## 2023-06-19 PROCEDURE — 99214 OFFICE O/P EST MOD 30 MIN: CPT

## 2023-06-19 RX ORDER — MULTIVIT-MIN/IRON/FOLIC ACID/K 18-600-40
CAPSULE ORAL
COMMUNITY

## 2023-06-19 RX ORDER — ALENDRONATE SODIUM 70 MG/1
70 TABLET ORAL
Qty: 12 TABLET | Refills: 3 | Status: SHIPPED | OUTPATIENT
Start: 2023-06-19

## 2023-06-19 ASSESSMENT — FIBROSIS 4 INDEX: FIB4 SCORE: 1.58

## 2023-06-19 NOTE — PROGRESS NOTES
Chief Complaint: Follow up for Osteoporosis/Osteopenia    HPI:     Adeline Patel is a 73 y.o. female here for follow up of the above medical issue.  Since last visit patient reports feeling good.      Age related osteoporosis.   She denies interval fracture.    She denies interval fall.    She denies interval nephrolithiasis.    She reports adherence to calcium supplementation recommendations.    She reports adherence to vitamin D supplementation.    Her activity level: moderate regular exercise, aerobic < 3 days a week. Currently doing hikes, golf, yoga, and tennis    Current osteoporosis pharmacologic treatment: Fosamax 70 mg every 7 days.  She reports good medication adherence and denies missing any weekly doses.       Last Bone Density 7/25/2022  T score of -1.3 and a z score of 1.0 for lumbar spine and T score of -2.5 and z score of -0.5 of left femur   Currently taking Calcium-magnesium-zinc (600mg of calcium)    Latest Reference Range & Units 04/19/23 04:15   Calcium 8.7 - 10.3 mg/dL 9.0      Latest Reference Range & Units 04/19/23 04:15   Alkaline Phosphatase 44 - 121 IU/L 72      Latest Reference Range & Units 04/19/23 04:15   Creatinine, Random Urine Not Estab. mg/dL 63.8   N-Telopeptide,Urine Not Estab. nmol    N-Tel-Cr Ratio 0 - 89 nM BCE/mM Cr 36   N-Telopeptide Interp  Comment   Calcium Random Urine Not Estab. mg/dL 3.4     2. Vitamin D deficiency  Currently taking vitamin D 3 2000 IU daily   Latest Reference Range & Units 04/19/23 04:15   25-Hydroxy   Vitamin D 25 30.0 - 100.0 ng/mL 38.2     Patient's medications, allergies, and social histories were reviewed and updated as appropriate.      ROS:       CONS:     No fever, no chills   EYES:     No diplopia, no blurry vision   CV:           No chest pain, no palpitations   PULM:     No SOB, no cough, no hemoptysis.   GI:            No nausea, no vomiting, no diarrhea, no constipation   ENDO:     No polyuria, no polydipsia, no heat  "intolerance, no cold intolerance     Past Medical History:  Problem List:  2023-02: Age-related osteoporosis without current pathological   fracture  2023-02: Vitamin D deficiency  2022-08: Other osteoporosis without current pathological fracture  2022-05: Cold sore  2022-05: Reactive depression  2022-05: Trapezius muscle spasm      Past Surgical History:  Past Surgical History:   Procedure Laterality Date    NEUROMA EXCISION Right 11/30/2020    Procedure: EXCISION, NEUROMA- 3RD WEBSPACE;  Surgeon: Jaun Cook M.D.;  Location: SURGERY [facility];  Service: Orthopedics    BONE SPUR EXCISION Right 11/30/2020    Procedure: EXCISION, BONE SPUR- CHEILECTOMY;  Surgeon: Jaun Cook M.D.;  Location: SURGERY [facility];  Service: Orthopedics    INGUINAL HERNIA REPAIR Right 1990    HYSTERECTOMY, TOTAL ABDOMINAL  1985        Allergies:  Patient has no known allergies.     Social History:  Social History     Tobacco Use    Smoking status: Never    Smokeless tobacco: Never   Vaping Use    Vaping Use: Never used   Substance Use Topics    Alcohol use: Yes     Alcohol/week: 0.6 oz     Types: 1 Glasses of wine per week     Comment: one per day     Drug use: Never        Family History:   family history includes Alcohol abuse in her father.      PHYSICAL EXAM:   Vital signs: /60   Pulse 80   Ht 1.626 m (5' 4\")   Wt 49.9 kg (110 lb)   SpO2 98%   BMI 18.88 kg/m²   GENERAL: Well-developed, well-nourished in no apparent distress.   EYE:  No ocular asymmetry, PERRLA  HENT: Pink, moist mucous membranes.    NECK: No thyromegaly.   CARDIOVASCULAR:  No murmurs  LUNGS: Clear breath sounds  ABDOMEN: Soft, nontender   EXTREMITIES: No clubbing, cyanosis, or edema.   NEUROLOGICAL: No gross focal motor abnormalities   LYMPH: No cervical adenopathy palpated.   SKIN: No rashes, lesions.       Labs:    Lab Results   Component Value Date/Time    WBC 5.1 09/23/2022 04:12 AM    RBC 4.29 09/23/2022 04:12 AM    HEMOGLOBIN 13.4 " 09/23/2022 04:12 AM    MCV 94 09/23/2022 04:12 AM    MCH 31.2 09/23/2022 04:12 AM    MCHC 33.1 09/23/2022 04:12 AM    RDW 13.1 09/23/2022 04:12 AM       Lab Results   Component Value Date/Time    SODIUM 142 04/19/2023 04:15 AM    POTASSIUM 4.1 04/19/2023 04:15 AM    CHLORIDE 106 04/19/2023 04:15 AM    CO2 22 04/19/2023 04:15 AM    GLUCOSE 102 (H) 04/19/2023 04:15 AM    BUN 13 04/19/2023 04:15 AM    CREATININE 0.72 04/19/2023 04:15 AM    CALCIUM 9.0 04/19/2023 04:15 AM    ASTSGOT 21 04/19/2023 04:15 AM    ALTSGPT 15 04/19/2023 04:15 AM    TBILIRUBIN 0.6 04/19/2023 04:15 AM    ALBUMIN 4.8 (H) 04/19/2023 04:15 AM    TOTPROTEIN 6.8 04/19/2023 04:15 AM    GLOBULIN 2.0 04/19/2023 04:15 AM    AGRATIO 2.4 (H) 04/19/2023 04:15 AM       No results found for: TSHULTRASEN  No results found for: FREET4  No results found for: FREET3  No results found for: THYSTIMIG        Imaging:        ASSESSMENT/PLAN:     1. Age-related osteoporosis without current pathological fracture  Stable  Continue current regimen- See HPI  Patient denies side effects of fosamax.   Patient understands to sit upright 30-60mins after taking the medication.   Continue daily weight bearing exercise  - alendronate (FOSAMAX) 70 MG Tab; Take 1 Tablet by mouth every 7 days.  Dispense: 12 Tablet; Refill: 3  - Comp Metabolic Panel; Future    2. Vitamin D deficiency  Unstable  Continue current regimen  - VITAMIN D,25 HYDROXY (DEFICIENCY); Future     Return in about 6 months (around 12/19/2023). Patient will have blood work done 2 weeks prior to next apt in 6 months.     Thank you kindly for allowing me to participate in the endocrine care plan for this patient.    Luis A Mills, LITO   06/19/23    CC:   Janine Freitas M.D.

## 2023-06-22 ENCOUNTER — PATIENT MESSAGE (OUTPATIENT)
Dept: MEDICAL GROUP | Facility: CLINIC | Age: 73
End: 2023-06-22
Payer: MEDICARE

## 2023-06-22 RX ORDER — ACYCLOVIR 400 MG/1
400 TABLET ORAL EVERY MORNING
Qty: 100 TABLET | Refills: 3 | Status: SHIPPED | OUTPATIENT
Start: 2023-06-22 | End: 2023-10-11 | Stop reason: SDUPTHER

## 2023-07-25 ENCOUNTER — PATIENT MESSAGE (OUTPATIENT)
Dept: MEDICAL GROUP | Facility: CLINIC | Age: 73
End: 2023-07-25
Payer: MEDICARE

## 2023-07-28 ENCOUNTER — PATIENT MESSAGE (OUTPATIENT)
Dept: MEDICAL GROUP | Facility: CLINIC | Age: 73
End: 2023-07-28
Payer: MEDICARE

## 2023-08-10 ENCOUNTER — PATIENT MESSAGE (OUTPATIENT)
Dept: MEDICAL GROUP | Facility: CLINIC | Age: 73
End: 2023-08-10
Payer: MEDICARE

## 2023-09-11 ENCOUNTER — HOSPITAL ENCOUNTER (OUTPATIENT)
Dept: RADIOLOGY | Facility: MEDICAL CENTER | Age: 73
End: 2023-09-11
Attending: FAMILY MEDICINE
Payer: MEDICARE

## 2023-09-11 DIAGNOSIS — Z12.31 VISIT FOR SCREENING MAMMOGRAM: ICD-10-CM

## 2023-09-11 PROCEDURE — 77063 BREAST TOMOSYNTHESIS BI: CPT

## 2023-09-27 ENCOUNTER — APPOINTMENT (RX ONLY)
Dept: URBAN - METROPOLITAN AREA CLINIC 20 | Facility: CLINIC | Age: 73
Setting detail: DERMATOLOGY
End: 2023-09-27

## 2023-09-27 DIAGNOSIS — D18.0 HEMANGIOMA: ICD-10-CM

## 2023-09-27 DIAGNOSIS — L82.1 OTHER SEBORRHEIC KERATOSIS: ICD-10-CM

## 2023-09-27 DIAGNOSIS — L57.0 ACTINIC KERATOSIS: ICD-10-CM

## 2023-09-27 DIAGNOSIS — L81.4 OTHER MELANIN HYPERPIGMENTATION: ICD-10-CM

## 2023-09-27 DIAGNOSIS — D22 MELANOCYTIC NEVI: ICD-10-CM

## 2023-09-27 DIAGNOSIS — D485 NEOPLASM OF UNCERTAIN BEHAVIOR OF SKIN: ICD-10-CM

## 2023-09-27 DIAGNOSIS — L57.8 OTHER SKIN CHANGES DUE TO CHRONIC EXPOSURE TO NONIONIZING RADIATION: ICD-10-CM

## 2023-09-27 DIAGNOSIS — Z85.820 PERSONAL HISTORY OF MALIGNANT MELANOMA OF SKIN: ICD-10-CM

## 2023-09-27 PROBLEM — D22.5 MELANOCYTIC NEVI OF TRUNK: Status: ACTIVE | Noted: 2023-09-27

## 2023-09-27 PROBLEM — D22.39 MELANOCYTIC NEVI OF OTHER PARTS OF FACE: Status: ACTIVE | Noted: 2023-09-27

## 2023-09-27 PROBLEM — D18.01 HEMANGIOMA OF SKIN AND SUBCUTANEOUS TISSUE: Status: ACTIVE | Noted: 2023-09-27

## 2023-09-27 PROBLEM — D48.5 NEOPLASM OF UNCERTAIN BEHAVIOR OF SKIN: Status: ACTIVE | Noted: 2023-09-27

## 2023-09-27 PROCEDURE — ? COUNSELING

## 2023-09-27 PROCEDURE — ? LIQUID NITROGEN

## 2023-09-27 PROCEDURE — 99213 OFFICE O/P EST LOW 20 MIN: CPT | Mod: 25

## 2023-09-27 PROCEDURE — 11102 TANGNTL BX SKIN SINGLE LES: CPT

## 2023-09-27 PROCEDURE — 17000 DESTRUCT PREMALG LESION: CPT | Mod: 59

## 2023-09-27 PROCEDURE — ? DIAGNOSIS COMMENT

## 2023-09-27 PROCEDURE — ? BIOPSY BY SHAVE METHOD

## 2023-09-27 ASSESSMENT — LOCATION ZONE DERM
LOCATION ZONE: LEG
LOCATION ZONE: ARM
LOCATION ZONE: TRUNK
LOCATION ZONE: FACE

## 2023-09-27 ASSESSMENT — LOCATION DETAILED DESCRIPTION DERM
LOCATION DETAILED: LEFT SUPERIOR MEDIAL FOREHEAD
LOCATION DETAILED: RIGHT MEDIAL MALAR CHEEK
LOCATION DETAILED: RIGHT MID-UPPER BACK
LOCATION DETAILED: RIGHT INFERIOR UPPER BACK
LOCATION DETAILED: LEFT MEDIAL SUPERIOR CHEST
LOCATION DETAILED: RIGHT SUPERIOR MEDIAL UPPER BACK
LOCATION DETAILED: LEFT ANTERIOR DISTAL THIGH
LOCATION DETAILED: RIGHT CENTRAL MALAR CHEEK
LOCATION DETAILED: RIGHT ANTERIOR PROXIMAL UPPER ARM
LOCATION DETAILED: RIGHT CENTRAL BUCCAL CHEEK
LOCATION DETAILED: RIGHT ANTERIOR DISTAL THIGH
LOCATION DETAILED: RIGHT DISTAL DORSAL FOREARM
LOCATION DETAILED: RIGHT LATERAL ABDOMEN
LOCATION DETAILED: LEFT ANTERIOR PROXIMAL UPPER ARM
LOCATION DETAILED: RIGHT PROXIMAL DORSAL FOREARM
LOCATION DETAILED: LEFT INFERIOR CENTRAL MALAR CHEEK
LOCATION DETAILED: LEFT DISTAL DORSAL FOREARM
LOCATION DETAILED: SUPERIOR MID FOREHEAD

## 2023-09-27 ASSESSMENT — LOCATION SIMPLE DESCRIPTION DERM
LOCATION SIMPLE: RIGHT UPPER BACK
LOCATION SIMPLE: SUPERIOR FOREHEAD
LOCATION SIMPLE: LEFT THIGH
LOCATION SIMPLE: RIGHT FOREARM
LOCATION SIMPLE: ABDOMEN
LOCATION SIMPLE: RIGHT UPPER ARM
LOCATION SIMPLE: CHEST
LOCATION SIMPLE: LEFT FOREARM
LOCATION SIMPLE: LEFT UPPER ARM
LOCATION SIMPLE: RIGHT THIGH
LOCATION SIMPLE: RIGHT CHEEK
LOCATION SIMPLE: LEFT CHEEK
LOCATION SIMPLE: LEFT FOREHEAD

## 2023-10-11 ENCOUNTER — OFFICE VISIT (OUTPATIENT)
Dept: MEDICAL GROUP | Facility: CLINIC | Age: 73
End: 2023-10-11
Payer: MEDICARE

## 2023-10-11 VITALS
WEIGHT: 110 LBS | HEART RATE: 64 BPM | TEMPERATURE: 98 F | DIASTOLIC BLOOD PRESSURE: 63 MMHG | SYSTOLIC BLOOD PRESSURE: 122 MMHG | OXYGEN SATURATION: 95 % | HEIGHT: 64 IN | BODY MASS INDEX: 18.78 KG/M2

## 2023-10-11 DIAGNOSIS — Z23 NEED FOR VACCINATION: ICD-10-CM

## 2023-10-11 DIAGNOSIS — E55.9 VITAMIN D DEFICIENCY: ICD-10-CM

## 2023-10-11 DIAGNOSIS — B00.1 COLD SORE: Primary | ICD-10-CM

## 2023-10-11 DIAGNOSIS — Z11.59 NEED FOR HEPATITIS C SCREENING TEST: ICD-10-CM

## 2023-10-11 DIAGNOSIS — F32.9 REACTIVE DEPRESSION: ICD-10-CM

## 2023-10-11 DIAGNOSIS — M81.0 AGE-RELATED OSTEOPOROSIS WITHOUT CURRENT PATHOLOGICAL FRACTURE: ICD-10-CM

## 2023-10-11 PROCEDURE — 3078F DIAST BP <80 MM HG: CPT | Performed by: FAMILY MEDICINE

## 2023-10-11 PROCEDURE — 3074F SYST BP LT 130 MM HG: CPT | Performed by: FAMILY MEDICINE

## 2023-10-11 PROCEDURE — G0008 ADMIN INFLUENZA VIRUS VAC: HCPCS | Performed by: FAMILY MEDICINE

## 2023-10-11 PROCEDURE — 99214 OFFICE O/P EST MOD 30 MIN: CPT | Mod: 25 | Performed by: FAMILY MEDICINE

## 2023-10-11 PROCEDURE — 90662 IIV NO PRSV INCREASED AG IM: CPT | Performed by: FAMILY MEDICINE

## 2023-10-11 RX ORDER — ACYCLOVIR 400 MG/1
400 TABLET ORAL EVERY MORNING
Qty: 100 TABLET | Refills: 3 | Status: SHIPPED | OUTPATIENT
Start: 2023-10-11

## 2023-10-11 ASSESSMENT — PATIENT HEALTH QUESTIONNAIRE - PHQ9: CLINICAL INTERPRETATION OF PHQ2 SCORE: 0

## 2023-10-11 ASSESSMENT — FIBROSIS 4 INDEX: FIB4 SCORE: 1.58

## 2023-10-11 NOTE — ASSESSMENT & PLAN NOTE
Chronic condition. Controlled.  DEXA results reviewed.  CMP normal except for glucose 102.  TSH normal.

## 2023-10-11 NOTE — PROGRESS NOTES
"  HPI:  Patient is a 73 y.o. female. This pleasant patient is here today.  She declines Medicare wellness visit.    Problem   Age-Related Osteoporosis Without Current Pathological Fracture    Seeing endocrinology APRN. Patient was restarted on fosamax.  She is doing weight bearing exercise nearly daily.  Doing well.      Vitamin D Deficiency    Recent labs show Vitamin D 32.     Reactive Depression    She feels less jaquelin.  No SI or HI. She states she used to be \"very happy.\"  She now is doing better.  Her  has had multiple orthopedic surgeries over the past 5 years and has been in a bad mood.  She finds jaquelin from seeing friends.  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                                                                                                                                     Patient Active Problem List    Diagnosis Date Noted    Age-related osteoporosis without current pathological fracture 02/22/2023    Vitamin D deficiency 02/22/2023    Other osteoporosis without current pathological fracture 08/17/2022    Cold sore 05/26/2022    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/26/2022    Trapezius muscle spasm 05/26/2022       Current Outpatient Medications   Medication Sig Dispense Refill    acyclovir (ZOVIRAX) 400 MG tablet Take 1 Tablet by mouth every morning. 100 Tablet 3    alendronate (FOSAMAX) 70 MG Tab Take 1 Tablet by mouth every 7 days. 12 Tablet 3    Calcium-Magnesium-Zinc (JAME-MAG-ZINC PO) Take 1 Tablet by mouth every morning. 600 mg      Vitamin D, Cholecalciferol, 50 MCG (2000 UT) Cap Take  by mouth.       No current facility-administered medications for this visit.         Allergies as of 10/11/2023    (No Known Allergies)          /63   Pulse 64   Temp 36.7 °C (98 °F)   Ht 1.626 m (5' 4\")   Wt 49.9 kg (110 lb)   SpO2 95%   BMI 18.88 kg/m²     Gen: no fevers/chills, no changes in weight  Eyes: no changes in vision  ENT: no sore throat, no hearing " loss, no bloody nose  Pulm: no sob, no cough  CV: no chest pain, no palpitations  GI: no nausea/vomiting, no diarrhea  : no dysuria or flank pain  MSk: no myalgias  Skin: no rash. History of melanoma. Sees dermatology routinely.  Psych: see HPI  Neuro: no headaches, no numbness/tingling  Heme/Lymph: no easy bruising or bleeding    Physical Exam:  Gen:         Alert and oriented, No apparent distress.  Neck:        No Lymphadenopathy or Bruits.  Lungs:     Clear to auscultation bilaterally  CV:           Regular rate and rhythm. No murmurs, rubs or gallops.    Abdomen: soft, nontender, nondistended.    Skin:      no rash or exudate             Ext:          No clubbing, cyanosis, edema.      Assessment and Plan    73 y.o. female with the following-  Problem List Items Addressed This Visit       Cold sore    Relevant Medications    acyclovir (ZOVIRAX) 400 MG tablet    Reactive depression     Chronic condition.  Controlled.  Not on SSRI.  Feeling well.          Age-related osteoporosis without current pathological fracture     Chronic condition. Controlled.  DEXA results reviewed.  CMP normal except for glucose 102.  TSH normal.          Vitamin D deficiency     Chronic condition. Controlled.  Takes vitamin D supplementation.           Other Visit Diagnoses       Need for vaccination        Relevant Orders    Influenza Vaccine, High Dose (65+ Only)          Return in about 1 year (around 10/11/2024).

## 2023-12-27 ENCOUNTER — APPOINTMENT (RX ONLY)
Dept: URBAN - METROPOLITAN AREA CLINIC 20 | Facility: CLINIC | Age: 73
Setting detail: DERMATOLOGY
End: 2023-12-27

## 2023-12-27 DIAGNOSIS — L57.0 ACTINIC KERATOSIS: ICD-10-CM

## 2023-12-27 PROBLEM — D48.5 NEOPLASM OF UNCERTAIN BEHAVIOR OF SKIN: Status: ACTIVE | Noted: 2023-12-27

## 2023-12-27 PROCEDURE — 11102 TANGNTL BX SKIN SINGLE LES: CPT

## 2023-12-27 PROCEDURE — ? ADDITIONAL NOTES

## 2023-12-27 PROCEDURE — ? COUNSELING

## 2023-12-27 PROCEDURE — 99213 OFFICE O/P EST LOW 20 MIN: CPT | Mod: 25

## 2023-12-27 PROCEDURE — ? BIOPSY BY SHAVE METHOD

## 2023-12-27 ASSESSMENT — LOCATION SIMPLE DESCRIPTION DERM
LOCATION SIMPLE: RIGHT FOREHEAD
LOCATION SIMPLE: LEFT FOREHEAD

## 2023-12-27 ASSESSMENT — LOCATION ZONE DERM: LOCATION ZONE: FACE

## 2023-12-27 ASSESSMENT — LOCATION DETAILED DESCRIPTION DERM
LOCATION DETAILED: LEFT SUPERIOR MEDIAL FOREHEAD
LOCATION DETAILED: RIGHT SUPERIOR FOREHEAD

## 2023-12-27 NOTE — PROCEDURE: MIPS QUALITY
Detail Level: Detailed
Quality 111:Pneumonia Vaccination Status For Older Adults: Pneumococcal Vaccination Previously Received
Quality 226: Preventive Care And Screening: Tobacco Use: Screening And Cessation Intervention: Patient screened for tobacco use and is an ex/non-smoker
Quality 402: Tobacco Use And Help With Quitting Among Adolescents: Patient screened for tobacco and never smoked
Quality 130: Documentation Of Current Medications In The Medical Record: Current Medications Documented
Quality 431: Preventive Care And Screening: Unhealthy Alcohol Use - Screening: Patient screened for unhealthy alcohol use using a single question and scores less than 2 times per year

## 2023-12-28 LAB
25(OH)D3+25(OH)D2 SERPL-MCNC: 54.8 NG/ML (ref 30–100)
ALBUMIN SERPL-MCNC: 4.7 G/DL (ref 3.8–4.8)
ALBUMIN/GLOB SERPL: 2.5 {RATIO} (ref 1.2–2.2)
ALP SERPL-CCNC: 58 IU/L (ref 44–121)
ALT SERPL-CCNC: 20 IU/L (ref 0–32)
AST SERPL-CCNC: 22 IU/L (ref 0–40)
BILIRUB SERPL-MCNC: 0.7 MG/DL (ref 0–1.2)
BUN SERPL-MCNC: 19 MG/DL (ref 8–27)
BUN/CREAT SERPL: 24 (ref 12–28)
CALCIUM SERPL-MCNC: 9.5 MG/DL (ref 8.7–10.3)
CHLORIDE SERPL-SCNC: 104 MMOL/L (ref 96–106)
CO2 SERPL-SCNC: 23 MMOL/L (ref 20–29)
CREAT SERPL-MCNC: 0.8 MG/DL (ref 0.57–1)
EGFRCR SERPLBLD CKD-EPI 2021: 77 ML/MIN/1.73
GLOBULIN SER CALC-MCNC: 1.9 G/DL (ref 1.5–4.5)
GLUCOSE SERPL-MCNC: 106 MG/DL (ref 70–99)
POTASSIUM SERPL-SCNC: 4.3 MMOL/L (ref 3.5–5.2)
PROT SERPL-MCNC: 6.6 G/DL (ref 6–8.5)
SODIUM SERPL-SCNC: 141 MMOL/L (ref 134–144)

## 2024-01-03 ENCOUNTER — OFFICE VISIT (OUTPATIENT)
Dept: ENDOCRINOLOGY | Facility: MEDICAL CENTER | Age: 74
End: 2024-01-03
Payer: MEDICARE

## 2024-01-03 VITALS
SYSTOLIC BLOOD PRESSURE: 108 MMHG | WEIGHT: 111 LBS | HEART RATE: 85 BPM | BODY MASS INDEX: 18.95 KG/M2 | HEIGHT: 64 IN | OXYGEN SATURATION: 92 % | DIASTOLIC BLOOD PRESSURE: 64 MMHG

## 2024-01-03 DIAGNOSIS — E55.9 VITAMIN D DEFICIENCY: ICD-10-CM

## 2024-01-03 DIAGNOSIS — M81.0 AGE-RELATED OSTEOPOROSIS WITHOUT CURRENT PATHOLOGICAL FRACTURE: ICD-10-CM

## 2024-01-03 PROCEDURE — 99211 OFF/OP EST MAY X REQ PHY/QHP: CPT

## 2024-01-03 PROCEDURE — 99214 OFFICE O/P EST MOD 30 MIN: CPT

## 2024-01-03 PROCEDURE — 3078F DIAST BP <80 MM HG: CPT

## 2024-01-03 PROCEDURE — 3074F SYST BP LT 130 MM HG: CPT

## 2024-01-03 ASSESSMENT — FIBROSIS 4 INDEX: FIB4 SCORE: 1.44

## 2024-01-03 NOTE — PROGRESS NOTES
Chief Complaint: Follow up for Osteoporosis/Osteopenia    HPI:     Adeline Patel is a 73 y.o. female here for follow up of the above medical issue.  Since last visit patient reports feeling good.      Age related osteoporosis.   She denies interval fracture.    She denies interval fall.    She denies interval nephrolithiasis.    She reports adherence to calcium supplementation recommendations.    She reports adherence to vitamin D supplementation.    Her activity level: moderate regular exercise, aerobic < 3 days a week. Currently doing walks, yoga, and tennis    Current osteoporosis pharmacologic treatment: Fosamax 70 mg every 7 days.  She reports good medication adherence and denies missing any weekly doses.       Last Bone Density 7/25/2022  T score of -1.3 and a z score of 1.0 for lumbar spine and T score of -2.5 and z score of -0.5 of left femur   Currently taking Calcium-magnesium-zinc (600mg of calcium)    Latest Reference Range & Units 12/27/23 04:08   Calcium 8.7 - 10.3 mg/dL 9.5      Latest Reference Range & Units 12/27/23 04:08   Alkaline Phosphatase 44 - 121 IU/L 58      Latest Reference Range & Units 12/27/23 04:08   eGFR >59 mL/min/1.73 77     2. Vitamin D deficiency  Currently taking vitamin D 3 2000 IU daily   Latest Reference Range & Units 12/27/23 04:08   25-Hydroxy   Vitamin D 25 30.0 - 100.0 ng/mL 54.8     Patient's medications, allergies, and social histories were reviewed and updated as appropriate.      ROS:       CONS:     No fever, no chills   EYES:     No diplopia, no blurry vision   CV:           No chest pain, no palpitations   PULM:     No SOB, no cough, no hemoptysis.   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  2022-05: Trapezius muscle spasm      Past Surgical History:  Past Surgical History:   Procedure Laterality Date    NEUROMA EXCISION Right 11/30/2020    Procedure: EXCISION, NEUROMA- 3RD WEBSPACE;  Surgeon: Jaun Cook M.D.;  Location: SURGERY [facility];  Service: Orthopedics    BONE SPUR EXCISION Right 11/30/2020    Procedure: EXCISION, BONE SPUR- CHEILECTOMY;  Surgeon: Jaun Cook M.D.;  Location: SURGERY [facility];  Service: Orthopedics    INGUINAL HERNIA REPAIR Right 1990    HYSTERECTOMY, TOTAL ABDOMINAL  1985        Allergies:  Patient has no known allergies.     Social History:  Social History     Tobacco Use    Smoking status: Never    Smokeless tobacco: Never   Vaping Use    Vaping Use: Never used   Substance Use Topics    Alcohol use: Yes     Alcohol/week: 0.6 oz     Types: 1 Glasses of wine per week     Comment: one per day     Drug use: Never        Family History:   family history includes Alcohol abuse in her father.      PHYSICAL EXAM:   Vital signs: /64 (BP Location: Left arm, Patient Position: Sitting)   Pulse 85   Ht 1.626 m (5' 4\")   Wt 50.3 kg (111 lb)   SpO2 92%   BMI 19.05 kg/m²   GENERAL: Well-developed, well-nourished in no apparent distress.   EYE:  No ocular asymmetry, PERRLA  HENT: Pink, moist mucous membranes.    NECK: No thyromegaly.   CARDIOVASCULAR:  No murmurs  LUNGS: Clear breath sounds  ABDOMEN: Soft, nontender   EXTREMITIES: No clubbing, cyanosis, or edema.   NEUROLOGICAL: No gross focal motor abnormalities   LYMPH: No cervical adenopathy palpated.   SKIN: No rashes, lesions.       Labs:    Lab Results   Component Value Date/Time    WBC 5.1 09/23/2022 04:12 AM    RBC 4.29 09/23/2022 04:12 AM    HEMOGLOBIN 13.4 09/23/2022 04:12 AM    MCV 94 09/23/2022 04:12 AM    MCH 31.2 09/23/2022 04:12 AM    MCHC 33.1 09/23/2022 04:12 AM    RDW 13.1 09/23/2022 04:12 AM "       Lab Results   Component Value Date/Time    SODIUM 141 12/27/2023 04:08 AM    POTASSIUM 4.3 12/27/2023 04:08 AM    CHLORIDE 104 12/27/2023 04:08 AM    CO2 23 12/27/2023 04:08 AM    GLUCOSE 106 (H) 12/27/2023 04:08 AM    BUN 19 12/27/2023 04:08 AM    CREATININE 0.80 12/27/2023 04:08 AM    CALCIUM 9.5 12/27/2023 04:08 AM    ASTSGOT 22 12/27/2023 04:08 AM    ALTSGPT 20 12/27/2023 04:08 AM    TBILIRUBIN 0.7 12/27/2023 04:08 AM    ALBUMIN 4.7 12/27/2023 04:08 AM    TOTPROTEIN 6.6 12/27/2023 04:08 AM    GLOBULIN 1.9 12/27/2023 04:08 AM    AGRATIO 2.5 (H) 12/27/2023 04:08 AM       No results found for: TSHULTRASEN  No results found for: FREET4  No results found for: FREET3  No results found for: THYSTIMIG        Imaging:        ASSESSMENT/PLAN:   1. Age-related osteoporosis without current pathological fracture  Stable  Continue current regimen- See HPI  Patient denies side effects of fosamax.   Patient understands to sit upright 30-60mins after taking the medication.   Continue daily weight bearing exercise  Fall precautions discussed.   - CBC WITH DIFFERENTIAL; Future  - Comp Metabolic Panel; Future  - FREE THYROXINE; Future  - PHOSPHORUS; Future  - PTH INTACT (PTH ONLY); Future  - TSH; Future  - DS-BONE DENSITY STUDY (DEXA); Future    2. Vitamin D deficiency  Stable  Continue current regimen - see HPI  - VITAMIN D,25 HYDROXY (DEFICIENCY); Future     Return in about 7 months (around 8/3/2024). Patient will have blood work done 2 weeks prior to next apt in 7 months.     Thank you kindly for allowing me to participate in the endocrine care plan for this patient.    Luis A Mills, APRN   01/03/24    CC:   Janine Freitas M.D.

## 2024-01-31 ENCOUNTER — TELEPHONE (OUTPATIENT)
Dept: HEALTH INFORMATION MANAGEMENT | Facility: OTHER | Age: 74
End: 2024-01-31
Payer: MEDICARE

## 2024-03-08 ENCOUNTER — TELEPHONE (OUTPATIENT)
Dept: ENDOCRINOLOGY | Facility: MEDICAL CENTER | Age: 74
End: 2024-03-08
Payer: MEDICARE

## 2024-03-08 NOTE — TELEPHONE ENCOUNTER
Left pt vm to let her know Luis A is leaving [facility] in May and we will need to reschedule her August appt with a different provider.

## 2024-03-17 ENCOUNTER — PATIENT MESSAGE (OUTPATIENT)
Dept: MEDICAL GROUP | Facility: CLINIC | Age: 74
End: 2024-03-17
Payer: MEDICARE

## 2024-03-17 DIAGNOSIS — U07.1 COVID-19 VIRUS INFECTION: ICD-10-CM

## 2024-04-08 ENCOUNTER — PATIENT MESSAGE (OUTPATIENT)
Dept: MEDICAL GROUP | Facility: CLINIC | Age: 74
End: 2024-04-08
Payer: MEDICARE

## 2024-04-08 DIAGNOSIS — M81.0 AGE-RELATED OSTEOPOROSIS WITHOUT CURRENT PATHOLOGICAL FRACTURE: ICD-10-CM

## 2024-04-24 ENCOUNTER — APPOINTMENT (RX ONLY)
Dept: URBAN - METROPOLITAN AREA CLINIC 20 | Facility: CLINIC | Age: 74
Setting detail: DERMATOLOGY
End: 2024-04-24

## 2024-04-24 DIAGNOSIS — L82.1 OTHER SEBORRHEIC KERATOSIS: ICD-10-CM

## 2024-04-24 DIAGNOSIS — L57.8 OTHER SKIN CHANGES DUE TO CHRONIC EXPOSURE TO NONIONIZING RADIATION: ICD-10-CM

## 2024-04-24 DIAGNOSIS — D18.0 HEMANGIOMA: ICD-10-CM

## 2024-04-24 DIAGNOSIS — L81.4 OTHER MELANIN HYPERPIGMENTATION: ICD-10-CM

## 2024-04-24 DIAGNOSIS — D22 MELANOCYTIC NEVI: ICD-10-CM

## 2024-04-24 DIAGNOSIS — Z85.820 PERSONAL HISTORY OF MALIGNANT MELANOMA OF SKIN: ICD-10-CM

## 2024-04-24 PROBLEM — D22.39 MELANOCYTIC NEVI OF OTHER PARTS OF FACE: Status: ACTIVE | Noted: 2024-04-24

## 2024-04-24 PROBLEM — D18.01 HEMANGIOMA OF SKIN AND SUBCUTANEOUS TISSUE: Status: ACTIVE | Noted: 2024-04-24

## 2024-04-24 PROBLEM — D22.5 MELANOCYTIC NEVI OF TRUNK: Status: ACTIVE | Noted: 2024-04-24

## 2024-04-24 PROCEDURE — ? COUNSELING

## 2024-04-24 PROCEDURE — ? DIAGNOSIS COMMENT

## 2024-04-24 PROCEDURE — 99213 OFFICE O/P EST LOW 20 MIN: CPT

## 2024-04-24 ASSESSMENT — LOCATION DETAILED DESCRIPTION DERM
LOCATION DETAILED: RIGHT MEDIAL MALAR CHEEK
LOCATION DETAILED: RIGHT DISTAL DORSAL FOREARM
LOCATION DETAILED: RIGHT INFERIOR UPPER BACK
LOCATION DETAILED: RIGHT CENTRAL MALAR CHEEK
LOCATION DETAILED: RIGHT MID-UPPER BACK
LOCATION DETAILED: RIGHT ANTERIOR PROXIMAL UPPER ARM
LOCATION DETAILED: LEFT MEDIAL SUPERIOR CHEST
LOCATION DETAILED: RIGHT SUPERIOR MEDIAL UPPER BACK
LOCATION DETAILED: LEFT DISTAL DORSAL FOREARM
LOCATION DETAILED: RIGHT PROXIMAL DORSAL FOREARM
LOCATION DETAILED: RIGHT CENTRAL BUCCAL CHEEK
LOCATION DETAILED: LEFT INFERIOR CENTRAL MALAR CHEEK
LOCATION DETAILED: LEFT ANTERIOR DISTAL THIGH
LOCATION DETAILED: RIGHT ANTERIOR DISTAL THIGH
LOCATION DETAILED: LEFT ANTERIOR PROXIMAL UPPER ARM

## 2024-04-24 ASSESSMENT — LOCATION SIMPLE DESCRIPTION DERM
LOCATION SIMPLE: CHEST
LOCATION SIMPLE: LEFT THIGH
LOCATION SIMPLE: LEFT CHEEK
LOCATION SIMPLE: RIGHT CHEEK
LOCATION SIMPLE: RIGHT FOREARM
LOCATION SIMPLE: RIGHT THIGH
LOCATION SIMPLE: LEFT UPPER ARM
LOCATION SIMPLE: RIGHT UPPER ARM
LOCATION SIMPLE: LEFT FOREARM
LOCATION SIMPLE: RIGHT UPPER BACK

## 2024-04-24 ASSESSMENT — LOCATION ZONE DERM
LOCATION ZONE: ARM
LOCATION ZONE: TRUNK
LOCATION ZONE: FACE
LOCATION ZONE: LEG

## 2024-07-22 ENCOUNTER — TELEPHONE (OUTPATIENT)
Dept: ENDOCRINOLOGY | Facility: MEDICAL CENTER | Age: 74
End: 2024-07-22
Payer: MEDICARE

## 2024-07-22 DIAGNOSIS — M81.0 AGE-RELATED OSTEOPOROSIS WITHOUT CURRENT PATHOLOGICAL FRACTURE: ICD-10-CM

## 2024-07-22 RX ORDER — ALENDRONATE SODIUM 70 MG/1
70 TABLET ORAL
Qty: 12 TABLET | Refills: 0 | Status: SHIPPED | OUTPATIENT
Start: 2024-07-22

## 2024-08-01 ENCOUNTER — HOSPITAL ENCOUNTER (OUTPATIENT)
Dept: RADIOLOGY | Facility: MEDICAL CENTER | Age: 74
End: 2024-08-01
Payer: MEDICARE

## 2024-08-01 DIAGNOSIS — M81.0 AGE-RELATED OSTEOPOROSIS WITHOUT CURRENT PATHOLOGICAL FRACTURE: ICD-10-CM

## 2024-08-01 PROCEDURE — 77080 DXA BONE DENSITY AXIAL: CPT

## 2024-08-05 ENCOUNTER — TELEPHONE (OUTPATIENT)
Dept: ENDOCRINOLOGY | Facility: MEDICAL CENTER | Age: 74
End: 2024-08-05
Payer: MEDICARE

## 2024-08-06 ENCOUNTER — HOSPITAL ENCOUNTER (OUTPATIENT)
Dept: LAB | Facility: MEDICAL CENTER | Age: 74
End: 2024-08-06
Payer: MEDICARE

## 2024-08-06 DIAGNOSIS — M81.0 AGE-RELATED OSTEOPOROSIS WITHOUT CURRENT PATHOLOGICAL FRACTURE: ICD-10-CM

## 2024-08-06 DIAGNOSIS — E55.9 VITAMIN D DEFICIENCY: ICD-10-CM

## 2024-08-06 LAB
25(OH)D3 SERPL-MCNC: 48 NG/ML (ref 30–100)
BASOPHILS # BLD AUTO: 0.8 % (ref 0–1.8)
BASOPHILS # BLD: 0.04 K/UL (ref 0–0.12)
EOSINOPHIL # BLD AUTO: 0.11 K/UL (ref 0–0.51)
EOSINOPHIL NFR BLD: 2.1 % (ref 0–6.9)
ERYTHROCYTE [DISTWIDTH] IN BLOOD BY AUTOMATED COUNT: 46.1 FL (ref 35.9–50)
HCT VFR BLD AUTO: 43.2 % (ref 37–47)
HGB BLD-MCNC: 13.9 G/DL (ref 12–16)
IMM GRANULOCYTES # BLD AUTO: 0.01 K/UL (ref 0–0.11)
IMM GRANULOCYTES NFR BLD AUTO: 0.2 % (ref 0–0.9)
LYMPHOCYTES # BLD AUTO: 2.97 K/UL (ref 1–4.8)
LYMPHOCYTES NFR BLD: 56.1 % (ref 22–41)
MCH RBC QN AUTO: 30.1 PG (ref 27–33)
MCHC RBC AUTO-ENTMCNC: 32.2 G/DL (ref 32.2–35.5)
MCV RBC AUTO: 93.5 FL (ref 81.4–97.8)
MONOCYTES # BLD AUTO: 0.38 K/UL (ref 0–0.85)
MONOCYTES NFR BLD AUTO: 7.2 % (ref 0–13.4)
NEUTROPHILS # BLD AUTO: 1.78 K/UL (ref 1.82–7.42)
NEUTROPHILS NFR BLD: 33.6 % (ref 44–72)
NRBC # BLD AUTO: 0 K/UL
NRBC BLD-RTO: 0 /100 WBC (ref 0–0.2)
PLATELET # BLD AUTO: 226 K/UL (ref 164–446)
PMV BLD AUTO: 11.8 FL (ref 9–12.9)
PTH-INTACT SERPL-MCNC: 33.8 PG/ML (ref 14–72)
RBC # BLD AUTO: 4.62 M/UL (ref 4.2–5.4)
T4 FREE SERPL-MCNC: 1.21 NG/DL (ref 0.93–1.7)
TSH SERPL-ACNC: 2.64 UIU/ML (ref 0.35–5.5)
WBC # BLD AUTO: 5.3 K/UL (ref 4.8–10.8)

## 2024-08-06 PROCEDURE — 80053 COMPREHEN METABOLIC PANEL: CPT

## 2024-08-06 PROCEDURE — 82306 VITAMIN D 25 HYDROXY: CPT

## 2024-08-06 PROCEDURE — 85025 COMPLETE CBC W/AUTO DIFF WBC: CPT

## 2024-08-06 PROCEDURE — 84100 ASSAY OF PHOSPHORUS: CPT

## 2024-08-06 PROCEDURE — 36415 COLL VENOUS BLD VENIPUNCTURE: CPT

## 2024-08-06 PROCEDURE — 83970 ASSAY OF PARATHORMONE: CPT

## 2024-08-06 PROCEDURE — 84439 ASSAY OF FREE THYROXINE: CPT

## 2024-08-06 PROCEDURE — 84443 ASSAY THYROID STIM HORMONE: CPT

## 2024-08-06 NOTE — TELEPHONE ENCOUNTER
Called and spoke with patient to remind them about upcoming appointment and outstanding labs needed for next visit on 8/15/24.

## 2024-08-07 LAB
ALBUMIN SERPL BCP-MCNC: 4.4 G/DL (ref 3.2–4.9)
ALBUMIN/GLOB SERPL: 1.8 G/DL
ALP SERPL-CCNC: 54 U/L (ref 30–99)
ALT SERPL-CCNC: 15 U/L (ref 2–50)
ANION GAP SERPL CALC-SCNC: 17 MMOL/L (ref 7–16)
AST SERPL-CCNC: 20 U/L (ref 12–45)
BILIRUB SERPL-MCNC: 0.6 MG/DL (ref 0.1–1.5)
BUN SERPL-MCNC: 17 MG/DL (ref 8–22)
CALCIUM ALBUM COR SERPL-MCNC: 8.7 MG/DL (ref 8.5–10.5)
CALCIUM SERPL-MCNC: 9 MG/DL (ref 8.5–10.5)
CHLORIDE SERPL-SCNC: 106 MMOL/L (ref 96–112)
CO2 SERPL-SCNC: 20 MMOL/L (ref 20–33)
CREAT SERPL-MCNC: 0.64 MG/DL (ref 0.5–1.4)
GFR SERPLBLD CREATININE-BSD FMLA CKD-EPI: 91 ML/MIN/1.73 M 2
GLOBULIN SER CALC-MCNC: 2.4 G/DL (ref 1.9–3.5)
GLUCOSE SERPL-MCNC: 105 MG/DL (ref 65–99)
PHOSPHATE SERPL-MCNC: 3.3 MG/DL (ref 2.5–4.5)
POTASSIUM SERPL-SCNC: 4.5 MMOL/L (ref 3.6–5.5)
PROT SERPL-MCNC: 6.8 G/DL (ref 6–8.2)
SODIUM SERPL-SCNC: 143 MMOL/L (ref 135–145)

## 2024-08-15 ENCOUNTER — OFFICE VISIT (OUTPATIENT)
Dept: ENDOCRINOLOGY | Facility: MEDICAL CENTER | Age: 74
End: 2024-08-15
Payer: MEDICARE

## 2024-08-15 VITALS
SYSTOLIC BLOOD PRESSURE: 114 MMHG | HEART RATE: 73 BPM | BODY MASS INDEX: 18.27 KG/M2 | WEIGHT: 107 LBS | HEIGHT: 64 IN | DIASTOLIC BLOOD PRESSURE: 60 MMHG | OXYGEN SATURATION: 97 %

## 2024-08-15 DIAGNOSIS — E55.9 VITAMIN D DEFICIENCY: ICD-10-CM

## 2024-08-15 DIAGNOSIS — M81.0 AGE-RELATED OSTEOPOROSIS WITHOUT CURRENT PATHOLOGICAL FRACTURE: ICD-10-CM

## 2024-08-15 PROCEDURE — 3078F DIAST BP <80 MM HG: CPT

## 2024-08-15 PROCEDURE — 99211 OFF/OP EST MAY X REQ PHY/QHP: CPT

## 2024-08-15 PROCEDURE — 99214 OFFICE O/P EST MOD 30 MIN: CPT

## 2024-08-15 PROCEDURE — 3074F SYST BP LT 130 MM HG: CPT

## 2024-08-15 ASSESSMENT — FIBROSIS 4 INDEX: FIB4 SCORE: 1.74

## 2024-08-15 NOTE — PROGRESS NOTES
Chief Complaint: Follow up for Osteoporosis/Osteopenia    HPI:     Adeline Patel is a 73 y.o. female here for follow up of the above medical issue.  Since last visit patient reports feeling good.      Age related osteoporosis.   She denies interval fracture.    She denies interval fall.    She denies interval nephrolithiasis.    She reports adherence to calcium supplementation recommendations.    She reports adherence to vitamin D supplementation.    Her activity level: moderate regular exercise, aerobic < 3 days a week. Currently doing walks, yoga, and tennis    Current osteoporosis pharmacologic treatment: Fosamax 70 mg every 7 days.  She reports good medication adherence and denies missing any weekly doses.       Last Bone Density   08/01/2024:  The lumbar spine has a mean bone mineral density of 0.946 g/cm2, with a T score of -1.7 and a Z score of 0.6.     The proximal left femur has a mean bone mineral density of 0.740 g/cm2, with a T score of -2.1 and a Z score of 0.0.    7/25/2022  T score of -1.3 and a z score of 1.0 for lumbar spine and T score of -2.5 and z score of -0.5 of left femur     Currently taking Calcium-magnesium-zinc (600mg of calcium)    Latest Reference Range & Units 08/06/24 08:04   Calcium 8.5 - 10.5 mg/dL 9.0   Correct Calcium 8.5 - 10.5 mg/dL 8.7      Latest Reference Range & Units 08/06/24 08:04   Alkaline Phosphatase 30 - 99 U/L 54      Latest Reference Range & Units 08/06/24 08:04   GFR (CKD-EPI) >60 mL/min/1.73 m 2 91      Latest Reference Range & Units 08/06/24 08:04   TSH 0.350 - 5.500 uIU/mL 2.640   Free T-4 0.93 - 1.70 ng/dL 1.21      Latest Reference Range & Units 08/06/24 08:04   Pth, Intact 14.0 - 72.0 pg/mL 33.8     2. Vitamin D deficiency  Currently taking vitamin D 3 2000 IU daily   Latest Reference Range & Units 08/06/24 08:04   25-Hydroxy   Vitamin D 25 30 - 100 ng/mL 48     Patient's medications, allergies, and social histories were reviewed and updated as  "appropriate.      ROS:       CONS:     No fever, no chills   EYES:     No diplopia, no blurry vision   CV:           No chest pain, no palpitations   PULM:     No SOB, no cough, no hemoptysis.   GI:            No nausea, no vomiting, no diarrhea, no constipation   ENDO:     No polyuria, no polydipsia, no heat intolerance, no cold intolerance     Past Medical History:  Problem List:  2023-02: Age-related osteoporosis without current pathological   fracture  2023-02: Vitamin D deficiency  2022-08: Other osteoporosis without current pathological fracture  2022-05: Cold sore  2022-05: Reactive depression  2022-05: Trapezius muscle spasm      Past Surgical History:  Past Surgical History:   Procedure Laterality Date    NEUROMA EXCISION Right 11/30/2020    Procedure: EXCISION, NEUROMA- 3RD WEBSPACE;  Surgeon: Jaun Cook M.D.;  Location: SURGERY [facility];  Service: Orthopedics    BONE SPUR EXCISION Right 11/30/2020    Procedure: EXCISION, BONE SPUR- CHEILECTOMY;  Surgeon: Jaun Cook M.D.;  Location: SURGERY [facility];  Service: Orthopedics    INGUINAL HERNIA REPAIR Right 1990    HYSTERECTOMY, TOTAL ABDOMINAL  1985        Allergies:  Patient has no known allergies.     Social History:  Social History     Tobacco Use    Smoking status: Never    Smokeless tobacco: Never   Vaping Use    Vaping status: Never Used   Substance Use Topics    Alcohol use: Yes     Alcohol/week: 0.6 oz     Types: 1 Glasses of wine per week     Comment: one per day     Drug use: Never        Family History:   family history includes Alcohol abuse in her father.      PHYSICAL EXAM:   Vital signs: /60   Pulse 73   Ht 1.626 m (5' 4\") Comment: pt stated  Wt 48.5 kg (107 lb)   SpO2 97%   BMI 18.37 kg/m²   GENERAL: Well-developed, well-nourished in no apparent distress.   EYE:  No ocular asymmetry, PERRLA  HENT: Pink, moist mucous membranes.    NECK: No thyromegaly.   CARDIOVASCULAR:  No murmurs  LUNGS: Clear breath " sounds  ABDOMEN: Soft, nontender   EXTREMITIES: No clubbing, cyanosis, or edema.   NEUROLOGICAL: No gross focal motor abnormalities   LYMPH: No cervical adenopathy palpated.   SKIN: No rashes, lesions.       Labs:    Lab Results   Component Value Date/Time    WBC 5.3 2024 08:04 AM    RBC 4.62 2024 08:04 AM    HEMOGLOBIN 13.9 2024 08:04 AM    MCV 93.5 2024 08:04 AM    MCH 30.1 2024 08:04 AM    MCHC 32.2 2024 08:04 AM    RDW 46.1 2024 08:04 AM    MPV 11.8 2024 08:04 AM       Lab Results   Component Value Date/Time    SODIUM 143 2024 08:04 AM    POTASSIUM 4.5 2024 08:04 AM    CHLORIDE 106 2024 08:04 AM    CO2 20 2024 08:04 AM    ANION 17.0 (H) 2024 08:04 AM    GLUCOSE 105 (H) 2024 08:04 AM    BUN 17 2024 08:04 AM    CREATININE 0.64 2024 08:04 AM    CALCIUM 9.0 2024 08:04 AM    ASTSGOT 20 2024 08:04 AM    ALTSGPT 15 2024 08:04 AM    TBILIRUBIN 0.6 2024 08:04 AM    ALBUMIN 4.4 2024 08:04 AM    TOTPROTEIN 6.8 2024 08:04 AM    GLOBULIN 2.4 2024 08:04 AM    AGRATIO 1.8 2024 08:04 AM       No results found for: TSHULTRASEN  No results found for: FREET4  No results found for: FREET3  No results found for: THYSTIMIG        Imagin2024 1:43 PM     HISTORY/REASON FOR EXAM:  Postmenopausal, hysterectomy, osteopenia of the lumbar spine, osteoporosis of the left femur     TECHNIQUE/EXAM DESCRIPTION AND NUMBER OF VIEWS:  Dual x-ray bone densitometry was performed from the L1 through L4 levels and from the proximal left femur utilizing the GE Prodigy unit.     COMPARISON: Bone densitometry scan of the lumbar spine 2020, and of the left femur 2022     FINDINGS:  The lumbar spine has a mean bone mineral density of 0.946 g/cm2, with a T score of -1.7 and a Z score of 0.6.     The proximal left femur has a mean bone mineral density of 0.740 g/cm2, with a T score of -2.1 and a Z  score of 0.0.     When compared with the most recent study dated 6/5/2020, there has been an 18.3% increase in the bone mineral density of the lumbar spine. Compared with 7/25/2022, there has been a 7.4% increase in the bone mineral density of the left femur.     IMPRESSION:     According to the World Health Organization classification, bone mineral density of this patient is osteopenic in the spine and osteopenic in the proximal left femur.     10-year Probability of Fracture:  Major Osteoporotic     11.6%  Hip     3.4%  Population      [place] ()     Based on left femur neck BMD      ASSESSMENT/PLAN:   1. Age-related osteoporosis without current pathological fracture  Stable  Medication:  Fosamax 70 mg weekly - continue  Continue calcium and vitamin D supplements  Patient has been on fosamax for one year.   Patient denies side effects of fosamax.   Patient understands to sit upright 30-60mins after taking the medication.   Continue daily weight bearing exercise  Fall precautions discussed.   Next Dexa scan in two years (2026)  - Comp Metabolic Panel; Future  - PHOSPHORUS; Future  - TSH; Future  - FREE THYROXINE; Future  - CTX COLLAGEN TYPE 1; Future  - OSTEOCALCIN, SERUM; Future    2. Vitamin D deficiency  Stable  Continue current regimen - see HPI  - VITAMIN D,25 HYDROXY (DEFICIENCY); Future     Return in about 1 year (around 8/15/2025). Patient will have blood work done 2 weeks prior to next apt in 1 year.     Thank you kindly for allowing me to participate in the endocrine care plan for this patient.    Luis A Mills, LITO   08/15/24    CC:   Janine Freitas M.D.

## 2024-09-26 DIAGNOSIS — M81.0 AGE-RELATED OSTEOPOROSIS WITHOUT CURRENT PATHOLOGICAL FRACTURE: ICD-10-CM

## 2024-09-26 RX ORDER — ALENDRONATE SODIUM 70 MG/1
70 TABLET ORAL
Qty: 12 TABLET | Refills: 0 | Status: SHIPPED | OUTPATIENT
Start: 2024-09-26

## 2024-09-26 NOTE — TELEPHONE ENCOUNTER
Received request via: Patient    Was the patient seen in the last year in this department? Yes    Does the patient have an active prescription (recently filled or refills available) for medication(s) requested? No    Pharmacy Name: [id number]    Does the patient have [facility] and need 100-day supply? (This applies to ALL medications) Patient does not have SCP    Patient called to request refill and asked if we could give a 90 day supply instead of 30

## 2024-10-16 ENCOUNTER — HOSPITAL ENCOUNTER (OUTPATIENT)
Dept: RADIOLOGY | Facility: MEDICAL CENTER | Age: 74
End: 2024-10-16
Attending: FAMILY MEDICINE
Payer: MEDICARE

## 2024-10-16 DIAGNOSIS — Z12.31 VISIT FOR SCREENING MAMMOGRAM: ICD-10-CM

## 2024-10-16 PROCEDURE — 77067 SCR MAMMO BI INCL CAD: CPT

## 2024-10-23 ENCOUNTER — APPOINTMENT (RX ONLY)
Dept: URBAN - METROPOLITAN AREA CLINIC 20 | Facility: CLINIC | Age: 74
Setting detail: DERMATOLOGY
End: 2024-10-23

## 2024-10-23 DIAGNOSIS — L57.0 ACTINIC KERATOSIS: ICD-10-CM

## 2024-10-23 DIAGNOSIS — D18.0 HEMANGIOMA: ICD-10-CM

## 2024-10-23 DIAGNOSIS — L81.4 OTHER MELANIN HYPERPIGMENTATION: ICD-10-CM

## 2024-10-23 DIAGNOSIS — Z85.820 PERSONAL HISTORY OF MALIGNANT MELANOMA OF SKIN: ICD-10-CM

## 2024-10-23 DIAGNOSIS — D22 MELANOCYTIC NEVI: ICD-10-CM

## 2024-10-23 DIAGNOSIS — L82.1 OTHER SEBORRHEIC KERATOSIS: ICD-10-CM

## 2024-10-23 DIAGNOSIS — L57.8 OTHER SKIN CHANGES DUE TO CHRONIC EXPOSURE TO NONIONIZING RADIATION: ICD-10-CM

## 2024-10-23 PROBLEM — D48.5 NEOPLASM OF UNCERTAIN BEHAVIOR OF SKIN: Status: ACTIVE | Noted: 2024-10-23

## 2024-10-23 PROBLEM — D22.5 MELANOCYTIC NEVI OF TRUNK: Status: ACTIVE | Noted: 2024-10-23

## 2024-10-23 PROBLEM — D18.01 HEMANGIOMA OF SKIN AND SUBCUTANEOUS TISSUE: Status: ACTIVE | Noted: 2024-10-23

## 2024-10-23 PROBLEM — D22.39 MELANOCYTIC NEVI OF OTHER PARTS OF FACE: Status: ACTIVE | Noted: 2024-10-23

## 2024-10-23 PROCEDURE — ? DIAGNOSIS COMMENT

## 2024-10-23 PROCEDURE — ? BIOPSY BY SHAVE METHOD

## 2024-10-23 PROCEDURE — ? ADDITIONAL NOTES

## 2024-10-23 PROCEDURE — 99213 OFFICE O/P EST LOW 20 MIN: CPT | Mod: 25

## 2024-10-23 PROCEDURE — 17000 DESTRUCT PREMALG LESION: CPT | Mod: 59

## 2024-10-23 PROCEDURE — ? LIQUID NITROGEN

## 2024-10-23 PROCEDURE — 11102 TANGNTL BX SKIN SINGLE LES: CPT

## 2024-10-23 PROCEDURE — ? COUNSELING

## 2024-10-23 ASSESSMENT — LOCATION DETAILED DESCRIPTION DERM
LOCATION DETAILED: RIGHT INFERIOR CENTRAL MALAR CHEEK
LOCATION DETAILED: RIGHT CENTRAL MALAR CHEEK
LOCATION DETAILED: RIGHT PROXIMAL DORSAL FOREARM
LOCATION DETAILED: LEFT ANTERIOR DISTAL THIGH
LOCATION DETAILED: SUPERIOR MID FOREHEAD
LOCATION DETAILED: RIGHT MEDIAL FOREHEAD
LOCATION DETAILED: RIGHT INFERIOR UPPER BACK
LOCATION DETAILED: LEFT DISTAL DORSAL FOREARM
LOCATION DETAILED: LEFT ANTERIOR PROXIMAL UPPER ARM
LOCATION DETAILED: LEFT MEDIAL SUPERIOR CHEST
LOCATION DETAILED: LEFT INFERIOR CENTRAL MALAR CHEEK
LOCATION DETAILED: RIGHT CENTRAL BUCCAL CHEEK
LOCATION DETAILED: RIGHT DISTAL DORSAL FOREARM
LOCATION DETAILED: RIGHT MEDIAL MALAR CHEEK
LOCATION DETAILED: RIGHT SUPERIOR MEDIAL UPPER BACK
LOCATION DETAILED: RIGHT MID-UPPER BACK
LOCATION DETAILED: RIGHT ANTERIOR DISTAL THIGH
LOCATION DETAILED: RIGHT ANTERIOR PROXIMAL UPPER ARM

## 2024-10-23 ASSESSMENT — LOCATION SIMPLE DESCRIPTION DERM
LOCATION SIMPLE: RIGHT UPPER BACK
LOCATION SIMPLE: RIGHT FOREARM
LOCATION SIMPLE: LEFT UPPER ARM
LOCATION SIMPLE: LEFT FOREARM
LOCATION SIMPLE: SUPERIOR FOREHEAD
LOCATION SIMPLE: LEFT CHEEK
LOCATION SIMPLE: CHEST
LOCATION SIMPLE: RIGHT FOREHEAD
LOCATION SIMPLE: RIGHT CHEEK
LOCATION SIMPLE: RIGHT THIGH
LOCATION SIMPLE: RIGHT UPPER ARM
LOCATION SIMPLE: LEFT THIGH

## 2024-10-23 ASSESSMENT — LOCATION ZONE DERM
LOCATION ZONE: LEG
LOCATION ZONE: TRUNK
LOCATION ZONE: ARM
LOCATION ZONE: FACE

## 2024-10-23 NOTE — HPI: FULL BODY SKIN EXAMINATION
Telephone Encounter by Rochelle Quiles RN at 08/08/17 07:33 AM     Author:  Rochelle Quiles RN Service:  (none) Author Type:  Registered Nurse     Filed:  08/08/17 07:33 AM Encounter Date:  8/7/2017 Status:  Signed     :  Rochelle Quiles RN (Registered Nurse)            To Dr. Reveles.[LC1.1M]      Revision History        User Key Date/Time User Provider Type Action    > LC1.1 08/08/17 07:33 AM Rochelle Quiles, RN Registered Nurse Sign    M - Manual            
What Type Of Note Output Would You Prefer (Optional)?: Bullet Format
What Is The Reason For Today's Visit?: Full Body Skin Examination
What Is The Reason For Today's Visit? (Being Monitored For X): concerning skin lesions on an annual basis

## 2024-10-23 NOTE — PROCEDURE: ADDITIONAL NOTES
Additional Notes: Referral for red light\\n\\nFace- 1 straight on 1 side to side
Detail Level: Simple
Render Risk Assessment In Note?: no

## 2024-10-26 DIAGNOSIS — M81.0 AGE-RELATED OSTEOPOROSIS WITHOUT CURRENT PATHOLOGICAL FRACTURE: ICD-10-CM

## 2024-10-27 RX ORDER — ALENDRONATE SODIUM 70 MG/1
70 TABLET ORAL
Qty: 12 TABLET | Refills: 0 | Status: SHIPPED | OUTPATIENT
Start: 2024-10-27

## 2024-11-05 DIAGNOSIS — U07.1 COVID-19 VIRUS INFECTION: ICD-10-CM

## 2024-11-25 ENCOUNTER — APPOINTMENT (OUTPATIENT)
Dept: URBAN - METROPOLITAN AREA CLINIC 20 | Facility: CLINIC | Age: 74
Setting detail: DERMATOLOGY
End: 2024-11-25

## 2024-11-25 DIAGNOSIS — L57.0 ACTINIC KERATOSIS: ICD-10-CM

## 2024-11-25 PROCEDURE — ? PHOTODYNAMIC THERAPY COUNSELING

## 2024-11-25 PROCEDURE — ? PDT: RED

## 2024-11-25 PROCEDURE — 96567 PDT DSTR PRMLG LES SKN: CPT

## 2024-11-25 ASSESSMENT — LOCATION SIMPLE DESCRIPTION DERM: LOCATION SIMPLE: LEFT FOREHEAD

## 2024-11-25 ASSESSMENT — LOCATION DETAILED DESCRIPTION DERM: LOCATION DETAILED: LEFT MEDIAL FOREHEAD

## 2024-11-25 ASSESSMENT — LOCATION ZONE DERM: LOCATION ZONE: FACE

## 2024-11-25 NOTE — PROCEDURE: PDT: RED
Show Anesthesia In Plan?: Yes
Application Method: under occlusion
Total Number Of Aks Treated (Optional To Report): 0
Anesthesia Type: 1% lidocaine with epinephrine
Post-Care Instructions: I reviewed with the patient in detail post-care instructions. Patient is to avoid sunlight for the next 2 days, and wear sun protection. Patients may expect sunburn like redness, discomfort and scabbing.
Photosensitizer: Ameluz
Who Performed The Pdt?: Performed by Nurse, MA or Aesthetician ([id number])
History Of Hsv?: No
Medical Necessity: Precancerous Lesions
Lot # (Optional): [id number]
Illumination Time: 00:13:30
Number Of Kerasticks/Tubes Of Metvixia/Tubes Of Ameluz Used: 1
Debridement Text (Will Only Render In Visit Note If You Select Debridement Option Under Who Performed The Pdt Field): Prior to application of the photodynamic medication the hyperkeratotic lesions were curetted to make them more amenable to therapy.
Expiration Date (Optional): 05/2026
Detail Level: Zone
Light Source: 635nm LED
Pre-Procedure Text: The treatment areas were cleaned and prepped in the usual fashion.
Incubation Time (Set To 00:00:00 If Not Wanted): 01:30:00
Consent: Written consent obtained.  The risks were reviewed with the patient including but not limited to: pigmentary changes, pain, blistering, scabbing, redness, and the remote possibility of scarring.
Ndc# (Optional): [id number]

## 2025-01-19 DIAGNOSIS — M81.0 AGE-RELATED OSTEOPOROSIS WITHOUT CURRENT PATHOLOGICAL FRACTURE: ICD-10-CM

## 2025-01-19 DIAGNOSIS — B00.1 COLD SORE: ICD-10-CM

## 2025-01-21 RX ORDER — ACYCLOVIR 400 MG/1
TABLET ORAL
Qty: 100 TABLET | Refills: 3 | Status: SHIPPED | OUTPATIENT
Start: 2025-01-21

## 2025-01-21 RX ORDER — ALENDRONATE SODIUM 70 MG/1
70 TABLET ORAL
Qty: 12 TABLET | Refills: 0 | Status: SHIPPED | OUTPATIENT
Start: 2025-01-21

## 2025-01-21 NOTE — TELEPHONE ENCOUNTER
Received request via: Pharmacy    Was the patient seen in the last year in this department? No    Does the patient have an active prescription (recently filled or refills available) for medication(s) requested? No    Pharmacy Name: [facility]    Does the patient have [facility] Plus and need 100-day supply? (This applies to ALL medications) Patient does not have SCP

## 2025-03-31 ENCOUNTER — TELEPHONE (OUTPATIENT)
Dept: HEALTH INFORMATION MANAGEMENT | Facility: OTHER | Age: 75
End: 2025-03-31
Payer: MEDICARE

## 2025-04-23 ENCOUNTER — APPOINTMENT (OUTPATIENT)
Dept: URBAN - METROPOLITAN AREA CLINIC 20 | Facility: CLINIC | Age: 75
Setting detail: DERMATOLOGY
End: 2025-04-23

## 2025-04-23 DIAGNOSIS — Z85.820 PERSONAL HISTORY OF MALIGNANT MELANOMA OF SKIN: ICD-10-CM

## 2025-04-23 DIAGNOSIS — L57.0 ACTINIC KERATOSIS: ICD-10-CM

## 2025-04-23 DIAGNOSIS — D22 MELANOCYTIC NEVI: ICD-10-CM

## 2025-04-23 DIAGNOSIS — D18.0 HEMANGIOMA: ICD-10-CM

## 2025-04-23 DIAGNOSIS — L82.1 OTHER SEBORRHEIC KERATOSIS: ICD-10-CM

## 2025-04-23 DIAGNOSIS — L81.4 OTHER MELANIN HYPERPIGMENTATION: ICD-10-CM

## 2025-04-23 DIAGNOSIS — L57.8 OTHER SKIN CHANGES DUE TO CHRONIC EXPOSURE TO NONIONIZING RADIATION: ICD-10-CM

## 2025-04-23 PROBLEM — D18.01 HEMANGIOMA OF SKIN AND SUBCUTANEOUS TISSUE: Status: ACTIVE | Noted: 2025-04-23

## 2025-04-23 PROBLEM — D22.5 MELANOCYTIC NEVI OF TRUNK: Status: ACTIVE | Noted: 2025-04-23

## 2025-04-23 PROBLEM — D22.39 MELANOCYTIC NEVI OF OTHER PARTS OF FACE: Status: ACTIVE | Noted: 2025-04-23

## 2025-04-23 PROCEDURE — 99213 OFFICE O/P EST LOW 20 MIN: CPT

## 2025-04-23 PROCEDURE — ? DIAGNOSIS COMMENT

## 2025-04-23 PROCEDURE — ? COUNSELING

## 2025-04-23 ASSESSMENT — LOCATION SIMPLE DESCRIPTION DERM
LOCATION SIMPLE: LEFT UPPER ARM
LOCATION SIMPLE: LEFT THIGH
LOCATION SIMPLE: CHEST
LOCATION SIMPLE: INFERIOR FOREHEAD
LOCATION SIMPLE: RIGHT UPPER ARM
LOCATION SIMPLE: RIGHT THIGH
LOCATION SIMPLE: LEFT CHEEK
LOCATION SIMPLE: RIGHT FOREARM
LOCATION SIMPLE: RIGHT CHEEK
LOCATION SIMPLE: RIGHT UPPER BACK
LOCATION SIMPLE: LEFT FOREARM

## 2025-04-23 ASSESSMENT — LOCATION DETAILED DESCRIPTION DERM
LOCATION DETAILED: LEFT ANTERIOR PROXIMAL UPPER ARM
LOCATION DETAILED: INFERIOR MID FOREHEAD
LOCATION DETAILED: RIGHT PROXIMAL DORSAL FOREARM
LOCATION DETAILED: RIGHT MID-UPPER BACK
LOCATION DETAILED: RIGHT ANTERIOR PROXIMAL UPPER ARM
LOCATION DETAILED: RIGHT ANTERIOR DISTAL THIGH
LOCATION DETAILED: RIGHT SUPERIOR MEDIAL UPPER BACK
LOCATION DETAILED: RIGHT MEDIAL MALAR CHEEK
LOCATION DETAILED: RIGHT INFERIOR UPPER BACK
LOCATION DETAILED: RIGHT CENTRAL BUCCAL CHEEK
LOCATION DETAILED: LEFT DISTAL DORSAL FOREARM
LOCATION DETAILED: RIGHT CENTRAL MALAR CHEEK
LOCATION DETAILED: LEFT MEDIAL SUPERIOR CHEST
LOCATION DETAILED: LEFT ANTERIOR DISTAL THIGH
LOCATION DETAILED: LEFT INFERIOR CENTRAL MALAR CHEEK
LOCATION DETAILED: RIGHT DISTAL DORSAL FOREARM

## 2025-04-23 ASSESSMENT — LOCATION ZONE DERM
LOCATION ZONE: LEG
LOCATION ZONE: ARM
LOCATION ZONE: TRUNK
LOCATION ZONE: FACE

## 2025-05-08 DIAGNOSIS — M81.0 AGE-RELATED OSTEOPOROSIS WITHOUT CURRENT PATHOLOGICAL FRACTURE: ICD-10-CM

## 2025-05-12 DIAGNOSIS — M81.0 AGE-RELATED OSTEOPOROSIS WITHOUT CURRENT PATHOLOGICAL FRACTURE: ICD-10-CM

## 2025-05-12 RX ORDER — ALENDRONATE SODIUM 70 MG/1
70 TABLET ORAL
Qty: 12 TABLET | Refills: 0 | Status: SHIPPED | OUTPATIENT
Start: 2025-05-12

## 2025-05-21 RX ORDER — ALENDRONATE SODIUM 70 MG/1
70 TABLET ORAL
Qty: 12 TABLET | Refills: 0 | Status: SHIPPED | OUTPATIENT
Start: 2025-05-21

## 2025-05-27 ENCOUNTER — PATIENT MESSAGE (OUTPATIENT)
Dept: MEDICAL GROUP | Facility: CLINIC | Age: 75
End: 2025-05-27
Payer: MEDICARE

## 2025-06-06 ENCOUNTER — APPOINTMENT (OUTPATIENT)
Dept: MEDICAL GROUP | Facility: CLINIC | Age: 75
End: 2025-06-06
Payer: MEDICARE

## 2025-06-06 VITALS
DIASTOLIC BLOOD PRESSURE: 64 MMHG | SYSTOLIC BLOOD PRESSURE: 117 MMHG | BODY MASS INDEX: 18.13 KG/M2 | OXYGEN SATURATION: 95 % | TEMPERATURE: 97.8 F | HEIGHT: 64 IN | HEART RATE: 75 BPM | WEIGHT: 106.2 LBS

## 2025-06-06 DIAGNOSIS — Z12.11 SCREEN FOR COLON CANCER: ICD-10-CM

## 2025-06-06 DIAGNOSIS — M81.8 OTHER OSTEOPOROSIS WITHOUT CURRENT PATHOLOGICAL FRACTURE: ICD-10-CM

## 2025-06-06 DIAGNOSIS — B02.9 HERPES ZOSTER WITHOUT COMPLICATION: Primary | ICD-10-CM

## 2025-06-06 DIAGNOSIS — Z23 NEED FOR VACCINATION: ICD-10-CM

## 2025-06-06 PROCEDURE — 90471 IMMUNIZATION ADMIN: CPT | Performed by: FAMILY MEDICINE

## 2025-06-06 PROCEDURE — G0439 PPPS, SUBSEQ VISIT: HCPCS | Mod: 25 | Performed by: FAMILY MEDICINE

## 2025-06-06 PROCEDURE — 90715 TDAP VACCINE 7 YRS/> IM: CPT | Performed by: FAMILY MEDICINE

## 2025-06-06 PROCEDURE — 3078F DIAST BP <80 MM HG: CPT | Performed by: FAMILY MEDICINE

## 2025-06-06 PROCEDURE — 3074F SYST BP LT 130 MM HG: CPT | Performed by: FAMILY MEDICINE

## 2025-06-06 ASSESSMENT — PATIENT HEALTH QUESTIONNAIRE - PHQ9
3. TROUBLE FALLING OR STAYING ASLEEP OR SLEEPING TOO MUCH: NOT AT ALL
7. TROUBLE CONCENTRATING ON THINGS, SUCH AS READING THE NEWSPAPER OR WATCHING TELEVISION: NOT AT ALL
2. FEELING DOWN, DEPRESSED, IRRITABLE, OR HOPELESS: NOT AT ALL
1. LITTLE INTEREST OR PLEASURE IN DOING THINGS: NOT AT ALL
9. THOUGHTS THAT YOU WOULD BE BETTER OFF DEAD, OR OF HURTING YOURSELF: NOT AT ALL
SUM OF ALL RESPONSES TO PHQ9 QUESTIONS 1 AND 2: 0
8. MOVING OR SPEAKING SO SLOWLY THAT OTHER PEOPLE COULD HAVE NOTICED. OR THE OPPOSITE, BEING SO FIGETY OR RESTLESS THAT YOU HAVE BEEN MOVING AROUND A LOT MORE THAN USUAL: NOT AT ALL
SUM OF ALL RESPONSES TO PHQ QUESTIONS 1-9: 0
5. POOR APPETITE OR OVEREATING: NOT AT ALL
4. FEELING TIRED OR HAVING LITTLE ENERGY: NOT AT ALL
6. FEELING BAD ABOUT YOURSELF - OR THAT YOU ARE A FAILURE OR HAVE LET YOURSELF OR YOUR FAMILY DOWN: NOT AL ALL

## 2025-06-06 ASSESSMENT — FIBROSIS 4 INDEX: FIB4 SCORE: 1.76

## 2025-06-06 NOTE — ASSESSMENT & PLAN NOTE
Chronic condition.  Controlled.  . She is seeing endocrinology.  She was started back on alendronate once a week.  Her DEXA is improved.  She sees endocrinology again this fall.

## 2025-06-06 NOTE — Clinical Note
[facility]  Janine Freitas M.D.  [address]  [place] [address]  Fax: [phone]   Authorization for Release/Disclosure of   Protected Health Information   Name: ADELINE LI : 1950 SSN: [id number]   Address: [address] [place] [address]  [place] [address] Phone:    [phone] (home)    I authorize the entity listed below to release/disclose the PHI below to:   [facility]/Janine Freitas M.D. and Janine Freitas M.D.   Provider or Entity Name:     Address   City, State, Zip   Phone:      Fax:     Reason for request: continuity of care   Information to be released:    [  ] LAST COLONOSCOPY,  including any PATH REPORT and follow-up  [  ] LAST FIT/COLOGUARD RESULT [  ] LAST DEXA  [  ] LAST MAMMOGRAM  [  ] LAST PAP  [  ] LAST LABS [  ] RETINA EXAM REPORT  [  ] IMMUNIZATION RECORDS  [  ] Release all info      [  ] Check here and initial the line next to each item to release ALL health information INCLUDING  _____ Care and treatment for drug and / or alcohol abuse  _____ HIV testing, infection status, or AIDS  _____ Genetic Testing    DATES OF SERVICE OR TIME PERIOD TO BE DISCLOSED: _____________  I understand and acknowledge that:  * This Authorization may be revoked at any time by you in writing, except if your health information has already been used or disclosed.  * Your health information that will be used or disclosed as a result of you signing this authorization could be re-disclosed by the recipient. If this occurs, your re-disclosed health information may no longer be protected by State or Federal laws.  * You may refuse to sign this Authorization. Your refusal will not affect your ability to obtain treatment.  * This Authorization becomes effective upon signing and will  on (date) __________.      If no date is indicated, this Authorization will  one (1) year from the signature date.    Name: Adeline Li  Signature: Date:   2025     PLEASE FAX REQUESTED  RECORDS BACK TO: [phone]

## 2025-06-06 NOTE — LETTER
June 6, 2025         Patient: Adeline Patel   YOB: 1950   Date of Visit: 6/6/2025           To Whom it May Concern:    Adeline Patel  needs Gym Membership to maintain Health .     Dx  Osteoporosis M81.0  Dx Thoracic Spine Pain  M54.6       Sincerely,           Janine Freitas M.D.  Electronically Signed

## 2025-06-06 NOTE — Clinical Note
[employer] [facility]  Janine Freitas M.D.  [address]  [place] [address]  Fax: [phone]   Authorization for Release/Disclosure of   Protected Health Information   Name: ADELINE LI : 1950 SSN: [id number]   Address: [address] [place] [address]  [place] [address] Phone:    [phone] (home)    I authorize the entity listed below to release/disclose the PHI below to:   [facility]/Janine Freitas M.D. and Janine Freitas M.D.   Provider or Entity Name:     Address   City, State, Zip   Phone:      Fax:     Reason for request: continuity of care   Information to be released:    [  ] LAST COLONOSCOPY,  including any PATH REPORT and follow-up  [  ] LAST FIT/COLOGUARD RESULT [  ] LAST DEXA  [  ] LAST MAMMOGRAM  [  ] LAST PAP  [  ] LAST LABS [  ] RETINA EXAM REPORT  [  ] IMMUNIZATION RECORDS  [  ] Release all info      [  ] Check here and initial the line next to each item to release ALL health information INCLUDING  _____ Care and treatment for drug and / or alcohol abuse  _____ HIV testing, infection status, or AIDS  _____ Genetic Testing    DATES OF SERVICE OR TIME PERIOD TO BE DISCLOSED: _____________  I understand and acknowledge that:  * This Authorization may be revoked at any time by you in writing, except if your health information has already been used or disclosed.  * Your health information that will be used or disclosed as a result of you signing this authorization could be re-disclosed by the recipient. If this occurs, your re-disclosed health information may no longer be protected by State or Federal laws.  * You may refuse to sign this Authorization. Your refusal will not affect your ability to obtain treatment.  * This Authorization becomes effective upon signing and will  on (date) __________.      If no date is indicated, this Authorization will  one (1) year from the signature date.    Name: Adeline Li  Signature: Date:   2025     PLEASE FAX REQUESTED  RECORDS BACK TO: [phone]

## 2025-06-06 NOTE — Clinical Note
[facility]  Janine Freitas M.D.  [address] [place] [facility] [address]  Fax: [phone]   Authorization for Release/Disclosure of   Protected Health Information   Name: ADELINE LI : 1950 SSN: [id number]   Address: [address] [place] [address]  [place] [address] Phone:    [phone] (home)    I authorize the entity listed below to release/disclose the PHI below to:   [facility]/Janine Freitas M.D. and Janine Freitas M.D.   Provider or Entity Name:     Address   City, State, Zip   Phone:      Fax:     Reason for request: continuity of care   Information to be released:    [  ] LAST COLONOSCOPY,  including any PATH REPORT and follow-up  [  ] LAST FIT/COLOGUARD RESULT [  ] LAST DEXA  [  ] LAST MAMMOGRAM  [  ] LAST PAP  [  ] LAST LABS [  ] RETINA EXAM REPORT  [  ] IMMUNIZATION RECORDS  [  ] Release all info      [  ] Check here and initial the line next to each item to release ALL health information INCLUDING  _____ Care and treatment for drug and / or alcohol abuse  _____ HIV testing, infection status, or AIDS  _____ Genetic Testing    DATES OF SERVICE OR TIME PERIOD TO BE DISCLOSED: _____________  I understand and acknowledge that:  * This Authorization may be revoked at any time by you in writing, except if your health information has already been used or disclosed.  * Your health information that will be used or disclosed as a result of you signing this authorization could be re-disclosed by the recipient. If this occurs, your re-disclosed health information may no longer be protected by State or Federal laws.  * You may refuse to sign this Authorization. Your refusal will not affect your ability to obtain treatment.  * This Authorization becomes effective upon signing and will  on (date) __________.      If no date is indicated, this Authorization will  one (1) year from the signature date.    Name: Adeline Li    Signature:   Date:     2025       PLEASE FAX  REQUESTED RECORDS BACK TO: [phone]

## 2025-06-06 NOTE — PROGRESS NOTES
Medicare Annual Wellness Visit and other concerns.     CC:The primary encounter diagnosis was Herpes zoster without complication. Diagnoses of Other osteoporosis without current pathological fracture, Need for vaccination, and Screen for colon cancer were also pertinent to this visit.      Assessment/Plan:  Herpes zoster without complication  Acute condition.Controlled.  No booster suggested until one year since shingles vaccine.      Other osteoporosis without current pathological fracture  Chronic condition.  Controlled.  . She is seeing endocrinology.  She was started back on alendronate once a week.  Her DEXA is improved.  She sees endocrinology again this fall.       Return in about 1 year (around 6/6/2026).      LABS: CMP, CBC, TSH and DEXA: Results reviewed and discussed with the patient, questions answered.      My total time spent caring for the patient on the day of the encounter was 35 minutes.   This does not include time spent on separately billable procedures/tests.      HISTORY OF PRESENT ILLNESS: Patient is a 75 y.o. female established patient who presents today to discuss annual issues      Health Maintenance: done    1. Herpes zoster without complication        2. Other osteoporosis without current pathological fracture        3. Need for vaccination  Tdap Vaccine =>8YO IM    Zoster Vac Recomb Adjuvanted (SHINGRIX) 50 MCG/0.5ML Recon Susp      4. Screen for colon cancer              Patient Active Problem List    Diagnosis Date Noted    Herpes zoster without complication 06/06/2025    Age-related osteoporosis without current pathological fracture 02/22/2023    Vitamin D deficiency 02/22/2023    Other osteoporosis without current pathological fracture 08/17/2022    Cold sore 05/26/2022    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/26/2022    Trapezius muscle spasm 05/26/2022      Allergies:Patient has no known allergies.    Current Medications[1]    Social History[2]  Social History     Social History Narrative    Not  "on file       Family History   Problem Relation Age of Onset    Alcohol abuse Father          Exam:    /64 (BP Location: Right arm, Patient Position: Sitting)   Pulse 75   Temp 36.6 °C (97.8 °F)   Ht 1.626 m (5' 4\")   Wt 48.2 kg (106 lb 3.2 oz)   SpO2 95%  Body mass index is 18.23 kg/m².    General:  Well nourished, well developed female in NAD  HENT: Atraumatic, normocephalic  EYES: Extraocular movements intact, pupils equal and reactive to light  NECK: Supple, FROM  CHEST: No deformities, Equal chest expansion  RESP: Unlabored, no stridor or audible wheeze  ABD: Non-Distended  Extremities: No Clubbing, Cyanosis, or Edema  Skin: Warm/dry, without rashes  Neuro: A/O x 4, CN 2-12 Grossly intact, Motor/sensory grossly intact  Psych: Normal behavior, normal affect      Chief Complaint   Patient presents with    Herpes Zoster    Other     Gneral visit       HPI:  Adeline Patel is a 75 y.o. here for Medicare Annual Wellness Visit     Patient Active Problem List    Diagnosis Date Noted    Herpes zoster without complication 06/06/2025    Age-related osteoporosis without current pathological fracture 02/22/2023    Vitamin D deficiency 02/22/2023    Other osteoporosis without current pathological fracture 08/17/2022    Cold sore 05/26/2022    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/26/2022    Trapezius muscle spasm 05/26/2022       Current Medications[3]         Current supplements as per medication list.     Allergies: Patient has no known allergies.    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       She  reports that she has never smoked. She has never used smokeless tobacco. She reports current alcohol use of about 0.6 oz of alcohol per week. She reports that she does not use drugs.  Counseling given: Not Answered      ROS:    Gait: Uses no assistive device   Ostomy: no   Other tubes: no   Amputations: no   Chronic oxygen use: no   Last eye exam:2024   : Denies any urinary leakage during the last 6 " months incontinence.     Screening:    Depression Screening  Little interest or pleasure in doing things?   Mood is good.  Feeling down, depressed , or hopeless?  Feels anxious at times.  When she feels down, it does not last long.   Trouble falling or staying asleep, or sleeping too much?   Ges to bed at 10pm and wakes 5-6 am.  She wakes once in the night with nocturia.  When she wakes her mind may race.  She is now journaling.   Feeling tired or having little energy?  She does not feel tired.  She has energy.   Poor appetite or overeating?  Eats well.    Feeling bad about yourself - or that you are a failure or have let yourself or your family down?  Feels good.   Trouble concentrating on things, such as reading the newspaper or watching television?  She concentrates well.   Moving or speaking so slowly that other people could have noticed.  Or the opposite - being so fidgety or restless that you have been moving around a lot more than usual?   No.   Thoughts that you would be better off dead, or of hurting yourself?   No.   Patient Health Questionnaire Score:      If depressive symptoms identified deferred to follow up visit unless specifically addressed in assessment and plan.    Interpretation of PHQ-9 Total Score   Score Severity   1-4 No Depression   5-9 Mild Depression   10-14 Moderate Depression   15-19 Moderately Severe Depression   20-27 Severe Depression    Screening for Cognitive Impairment  Do you or any of your friends or family members have any concern about your memory?  No.  At times she forgets why she came into a room.  But, she has good recall.   Three Minute Recall (Village, Kitchen, Baby) 3 /3    Curtis clock face with all 12 numbers and set the hands to show 10 minutes past 11.       Cognitive concerns identified deferred for follow up unless specifically addressed in assessment and plan.    Fall Risk Assessment  Has the patient had two or more falls in the last year or any fall with injury in  the last year?  No    Safety Assessment  Do you always wear your seatbelt?  Wears seatbelt   Any changes to home needed to function safely?  No changes needed.   Difficulty hearing.   Hears well.   Patient counseled about all safety risks that were identified.    Functional Assessment ADLs  Are there any barriers preventing you from cooking for yourself or meeting nutritional needs?   .  No issues  Are there any barriers preventing you from driving safely or obtaining transportation?   .  No issues  Are there any barriers preventing you from using a telephone or calling for help?     No issues  Are there any barriers preventing you from shopping?   . No issues   Are there any barriers preventing you from taking care of your own finances?    No issues   Are there any barriers preventing you from managing your medications?     No issues  Are there any barriers preventing you from showering, bathing or dressing yourself?    No issues.    Are there any barriers preventing you from doing housework or laundry?    No issues  Are there any barriers preventing you from using the toilet?   No issues.   Are you currently engaging in any exercise or physical activity?   .  No issues.     Self-Assessment of Health  What is your perception of your health?    Is good.   Do you sleep more than six hours a night?    Yes. Sleeps 8 hours most nights.   In the past 7 days, how much did pain keep you from doing your normal work?   Occasionally takes ibuprofen 200 mg po qd   Do you spend quality time with family or friends (virtually or in person)?    Yes.    Do you usually eat a heart healthy diet that constists of a variety of fruits, vegetables, whole grains and fiber?    Healthy diet.   Do you eat foods high in fat and/or Fast Food more than three times per week?   No   How concerned are you that your medical conditions are not being well managed?  She likes her medical care.     Are you worried that in the next 2 months, you may not  have stable housing that you own, rent, or stay in as part of a household?    She has stable housing.       Advance Care Planning  Do you have an Advance Directive, Living Will, Durable Power of , or POLST?  Will and trust and DPOA.                   Health Maintenance Summary            Current Care Gaps       COVID-19 Vaccine (5 - 2024-25 season) Overdue since 4/14/2025      10/14/2024  Imm Admin: COVID-19, mRNA, LNP-S, PF, david-sucrose, 30 mcg/0.3 mL    11/12/2021  Imm Admin: MODERNA SARS-COV-2 VACCINE (12+)    02/25/2021  Imm Admin: MODERNA SARS-COV-2 VACCINE (12+)    01/28/2021  Imm Admin: MODERNA SARS-COV-2 VACCINE (12+)              Hepatitis C Screening (Once) Never done     No completion history exists for this topic.                      Awaiting Completion       Zoster (Shingles) Vaccines (1 of 2) Order placed this encounter      06/06/2025  Order placed for Zoster Vac Recomb Adjuvanted (SHINGRIX) 50 MCG/0.5ML Recon Susp by Janine Freitas M.D.              IMM DTaP/Tdap/Td Vaccine (2 - Td or Tdap) Order placed this encounter      06/06/2025  Order placed for Tdap Vaccine =>6YO IM by Janine Freitas M.D.    12/15/2014  Imm Admin: Tdap Vaccine                      Upcoming       Annual Wellness Visit (Yearly) Next due on 6/6/2026 06/06/2025  Level of Service: [place] ANNUAL WELLNESS VISIT-INCLUDES PPPS SUBSEQUE*              Bone Density Scan (Every 5 Years) Next due on 8/1/2029 08/01/2024  DS-BONE DENSITY STUDY (DEXA)    07/25/2022  DS-BONE DENSITY STUDY (DEXA)                      Completed or No Longer Recommended       Hepatitis B Vaccine (Hep B) (Series Information) Completed      05/31/2005  Imm Admin: Hep A/HEP B Combined Vaccine (TwinRix)    10/07/2004  Imm Admin: Hep A/HEP B Combined Vaccine (TwinRix)    07/27/2004  Imm Admin: Hep A/HEP B Combined Vaccine (TwinRix)              Influenza Vaccine (Series Information) Completed      10/14/2024  Outside Immunization: Influenza  Tri, Adj    10/11/2023  Imm Admin: Influenza Vaccine Adult HD    10/04/2022  Imm Admin: Influenza Vaccine, Quadrivalent, Adjuvanted (Pf)    10/21/2021  Imm Admin: Influenza, Unspecified - HISTORICAL DATA    10/14/2020  Imm Admin: Influenza, Unspecified - HISTORICAL DATA     Only the first 5 history entries have been loaded, but more history exists.            Pneumococcal Vaccine: 50+ Years (Series Information) Completed      09/02/2021  Imm Admin: Pneumococcal polysaccharide vaccine (PPSV-23)    05/12/2017  Imm Admin: Pneumococcal Conjugate Vaccine (Prevnar/PCV-13)    08/06/2012  Imm Admin: Pneumococcal polysaccharide vaccine (PPSV-23)              Hepatitis A Vaccine (Hep A) (Series Information) Aged Out      05/31/2005  Imm Admin: Hep A/HEP B Combined Vaccine (TwinRix)    10/07/2004  Imm Admin: Hep A/HEP B Combined Vaccine (TwinRix)    07/27/2004  Imm Admin: Hep A/HEP B Combined Vaccine (TwinRix)              HPV Vaccines (Series Information) Aged Out      No completion history exists for this topic.              Polio Vaccine (Inactivated Polio) (Series Information) Aged Out     No completion history exists for this topic.              Meningococcal Immunization (Series Information) Aged Out     No completion history exists for this topic.              Meningococcal B Vaccine (Series Information) Aged Out     No completion history exists for this topic.              Mammogram  Discontinued        Frequency changed to Never automatically (Topic No Longer Applies)    10/16/2024  MA-SCREENING MAMMO BILAT W/TOMOSYNTHESIS W/CAD    09/11/2023  MA-SCREENING MAMMO BILAT W/TOMOSYNTHESIS W/CAD    08/15/2022  MA-SCREENING MAMMO BILAT W/TOMOSYNTHESIS W/CAD    06/08/2021  MA-SCREENING MAMMO BILAT W/TOMOSYNTHESIS W/CAD     Only the first 5 history entries have been loaded, but more history exists.            Colorectal Cancer Screening  Discontinued        Frequency changed to Never automatically (Topic No Longer Applies)     "10/06/2015  COLONOSCOPY RESULTS                            Patient Care Team:  Janine Freitas M.D. as PCP - General (Family Medicine)  [facility] (Dermatology)  Duong Dobson (Optometry)  KATHERINE Booker (Nurse Practitioner Adult Health)      Social History[4]  Family History   Problem Relation Age of Onset    Alcohol abuse Father      She  has a past medical history of Arthritis, Cancer (HCC) (2016), and Cold sore.   Past Surgical History[5]    Exam:   /64 (BP Location: Right arm, Patient Position: Sitting)   Pulse 75   Temp 36.6 °C (97.8 °F)   Ht 1.626 m (5' 4\")   Wt 48.2 kg (106 lb 3.2 oz)   SpO2 95%  Body mass index is 18.23 kg/m².      Gen:  Alert and oriented, no apparent distress.  ENT:  Eyes PERRLA with EOMI, oropharynx moist and clear without erythema or exudate. No oral ulcers. Good dentition.   Neck:  Neck is supple without lymphadenopathy.   Lungs:  Normal effort, CTA bilaterally, no wheezes, rhonchi, or rales  CV:  Regular rate and rhythm rubs, or gallops.  GI:  +BS, non-distended, non-tender to palpation, no masses or organomegaly   Ext:  No spinal tenderness No clubbing, cyanosis, edema.   Skin:  Skin is warm and dry. No rash noted.       Assessment and Plan. The following treatment and monitoring plan is recommended:    1. Herpes zoster without complication  Acute condition.Controlled.  No booster suggested until one year since shingles infection.      2. Other osteoporosis without current pathological fracture    Chronic condition.  Controlled.  . She is seeing endocrinology.  She was started back on alendronate once a week.  Her DEXA is improved.  She sees endocrinology again this fall.      3. Need for vaccination  - Tdap Vaccine =>6YO IM  - Zoster Vac Recomb Adjuvanted (SHINGRIX) 50 MCG/0.5ML Recon Susp; Inject 0.5 mL into the shoulder, thigh, or buttocks one time for 1 dose.  Dispense: 0.5 mL; Refill: 0    4. Screen for colon cancer   Last " colonoscopy 2015.  Mother is still alive at 105.   Referral to GI for colon cancer screening.       Services suggested: No services needed at this time  Health Care Screening: Age-appropriate preventive services Medicare covers discussed today and ordered if indicated.  Referrals offered: Community-based lifestyle interventions to reduce health risks and promote self-management and wellness, fall prevention, nutrition, physical activity, tobacco-use cessation, weight loss, and mental health services as per orders if indicated.    Discussion today about general wellness and lifestyle habits:    Prevent falls and reduce trip hazards; Cautioned about securing or removing rugs.  Have a working fire alarm and carbon monoxide detector;   Engage in regular physical activity and social activities     Follow-up: Return in about 1 year (around 6/6/2026).         [1]   Current Outpatient Medications   Medication Sig Dispense Refill    Zoster Vac Recomb Adjuvanted (SHINGRIX) 50 MCG/0.5ML Recon Susp Inject 0.5 mL into the shoulder, thigh, or buttocks one time for 1 dose. 0.5 mL 0    alendronate (FOSAMAX) 70 MG Tab Take 1 Tablet by mouth every 7 days. 12 Tablet 0    acyclovir (ZOVIRAX) 400 MG tablet TAKE ONE TABLET BY MOUTH EVERY MORNING (ZOVIRAX) 100 Tablet 3    Vitamin D, Cholecalciferol, 50 MCG (2000 UT) Cap Take  by mouth.      Calcium-Magnesium-Zinc (JAME-MAG-ZINC PO) Take 1 Tablet by mouth every morning. 600 mg       No current facility-administered medications for this visit.   [2]   Social History  Tobacco Use    Smoking status: Never    Smokeless tobacco: Never   Vaping Use    Vaping status: Never Used   Substance Use Topics    Alcohol use: Yes     Alcohol/week: 0.6 oz     Types: 1 Glasses of wine per week     Comment: one per day     Drug use: Never   [3]   Current Outpatient Medications   Medication Sig Dispense Refill    Zoster Vac Recomb Adjuvanted (SHINGRIX) 50 MCG/0.5ML Recon Susp Inject 0.5 mL into the shoulder,  thigh, or buttocks one time for 1 dose. 0.5 mL 0    alendronate (FOSAMAX) 70 MG Tab Take 1 Tablet by mouth every 7 days. 12 Tablet 0    acyclovir (ZOVIRAX) 400 MG tablet TAKE ONE TABLET BY MOUTH EVERY MORNING (ZOVIRAX) 100 Tablet 3    Vitamin D, Cholecalciferol, 50 MCG (2000 UT) Cap Take  by mouth.      Calcium-Magnesium-Zinc (JAME-MAG-ZINC PO) Take 1 Tablet by mouth every morning. 600 mg       No current facility-administered medications for this visit.   [4]   Social History  Tobacco Use    Smoking status: Never    Smokeless tobacco: Never   Vaping Use    Vaping status: Never Used   Substance Use Topics    Alcohol use: Yes     Alcohol/week: 0.6 oz     Types: 1 Glasses of wine per week     Comment: one per day     Drug use: Never   [5]   Past Surgical History:  Procedure Laterality Date    NEUROMA EXCISION Right 11/30/2020    Procedure: EXCISION, NEUROMA- 3RD WEBSPACE;  Surgeon: Jaun Cook M.D.;  Location: SURGERY [facility];  Service: Orthopedics    BONE SPUR EXCISION Right 11/30/2020    Procedure: EXCISION, BONE SPUR- CHEILECTOMY;  Surgeon: Jaun Cook M.D.;  Location: SURGERY [facility];  Service: Orthopedics    INGUINAL HERNIA REPAIR Right 1990    HYSTERECTOMY, TOTAL ABDOMINAL  1985

## 2025-06-16 NOTE — Clinical Note
REFERRAL APPROVAL NOTICE         Sent on June 16, 2025                   Adeline Patel  [address]  [place] [address]                   Dear Ms. Patel,    After a careful review of the medical information and benefit coverage, [facility] has processed your referral. See below for additional details.    If applicable, you must be actively enrolled with your insurance for coverage of the authorized service. If you have any questions regarding your coverage, please contact your insurance directly.    REFERRAL INFORMATION   Referral #:  [id number]  Referred-To Provider    Referred-By Provider:  Gastroenterology    LASHONDA Weinberg TIMOTHY E      [address]  [place] [address]  [phone] [id number]  [place] [address]  [phone]    Referral Start Date:  06/06/2025  Referral End Date:   06/06/2026             SCHEDULING  If you do not already have an appointment, please call [phone] to make an appointment.     MORE INFORMATION  If you do not already have a [employer] account, sign up at: [email or web address]  You can access your medical information, make appointments, see lab results, billing information, and more.  If you have questions regarding this referral, please contact  the [facility] Referrals department at:             [phone]. Monday - Friday 8:00AM - 5:00PM.     Sincerely,    [facility]

## (undated) DEVICE — SOD. CHL. INJ. 0.9% 1000 ML - (14EA/CA 60CA/PF)

## (undated) DEVICE — PROTECTOR ULNA NERVE - (36PR/CA)

## (undated) DEVICE — NEPTUNE 4 PORT MANIFOLD - (20/PK)

## (undated) DEVICE — SET EXTENSION WITH 2 PORTS (48EA/CA) ***PART #2C8610 IS A SUBSTITUTE*****

## (undated) DEVICE — SUCTION INSTRUMENT YANKAUER BULBOUS TIP W/O VENT (50EA/CA)

## (undated) DEVICE — WRAP CO-FLEX 4IN X 5YD STERIL - SELF-ADHERENT (18/CA)

## (undated) DEVICE — GLOVE BIOGEL ECLIPSE PF LATEX SIZE 8.0  (50PR/BX)

## (undated) DEVICE — CANISTER SUCTION 3000ML MECHANICAL FILTER AUTO SHUTOFF MEDI-VAC NONSTERILE LF DISP  (40EA/CA)

## (undated) DEVICE — LACTATED RINGERS INJ 1000 ML - (14EA/CA 60CA/PF)

## (undated) DEVICE — BONE WAX (12PK/BX)

## (undated) DEVICE — GLOVE SZ 6.5 BIOGEL PI MICRO - PF LF (50PR/BX)

## (undated) DEVICE — GLOVE BIOGEL SZ 7.5 SURGICAL PF LTX - (50PR/BX 4BX/CA)

## (undated) DEVICE — GLOVE BIOGEL SZ 6.5 SURGICAL PF LTX (50PR/BX 4BX/CA)

## (undated) DEVICE — ELECTRODE DUAL RETURN W/ CORD - (50/PK)

## (undated) DEVICE — SUTURE 3-0 VICRYL PLUS SH - 8X 18 INCH (12/BX)

## (undated) DEVICE — MASK, LARYNGEAL AIRWAY #4

## (undated) DEVICE — KIT ANESTHESIA W/CIRCUIT & 3/LT BAG W/FILTER (20EA/CA)

## (undated) DEVICE — GLOVE BIOGEL PI INDICATOR SZ 7.0 SURGICAL PF LF - (50/BX 4BX/CA)

## (undated) DEVICE — SET LEADWIRE 5 LEAD BEDSIDE DISPOSABLE ECG (1SET OF 5/EA)

## (undated) DEVICE — BANDAGE ELASTIC 4 HONEYCOMB - 4"X5YD LF (20/CA)"

## (undated) DEVICE — SENSOR SPO2 NEO LNCS ADHESIVE (20/BX) SEE USER NOTES

## (undated) DEVICE — TOURNIQUET CUFF 18 X 3 ONE PORT DISP - STERILE (10/BX)

## (undated) DEVICE — MASK ANESTHESIA ADULT  - (100/CA)

## (undated) DEVICE — SUTURE 3-0 ETHILON PS-1 (36PK/BX)

## (undated) DEVICE — SODIUM CHL IRRIGATION 0.9% 1000ML (12EA/CA)

## (undated) DEVICE — GLOVE BIOGEL PI INDICATOR SZ 8.0 SURGICAL PF LF -(50/BX 4BX/CA)

## (undated) DEVICE — TUBING CLEARLINK DUO-VENT - C-FLO (48EA/CA)

## (undated) DEVICE — PACK LOWER EXTREMITY - (2/CA)

## (undated) DEVICE — SUTURE GENERAL

## (undated) DEVICE — DRESSING 3X8 ADAPTIC GAUZE - NON-ADHERING (36/PK 6PK/BX)

## (undated) DEVICE — Device

## (undated) DEVICE — GLOVE BIOGEL PI INDICATOR SZ 6.5 SURGICAL PF LF - (50/BX 4BX/CA)

## (undated) DEVICE — GLOVE SZ 7 BIOGEL PI MICRO - PF LF (50PR/BX 4BX/CA)

## (undated) DEVICE — GOWN SURGEONS X-LARGE - DISP. (30/CA)

## (undated) DEVICE — GOWN WARMING STANDARD FLEX - (30/CA)

## (undated) DEVICE — HEAD HOLDER JUNIOR/ADULT

## (undated) DEVICE — SLEEVE, VASO, THIGH, MED

## (undated) DEVICE — GLOVE BIOGEL SZ 6 PF LATEX - (50EA/BX 4BX/CA)

## (undated) DEVICE — ELECTRODE 850 FOAM ADHESIVE - HYDROGEL RADIOTRNSPRNT (50/PK)

## (undated) DEVICE — PADDING CAST 4 IN X 4 YDS - SOF-ROLL (12RL/BG 6BG/CT)